# Patient Record
Sex: MALE | Race: BLACK OR AFRICAN AMERICAN | NOT HISPANIC OR LATINO | Employment: FULL TIME | URBAN - METROPOLITAN AREA
[De-identification: names, ages, dates, MRNs, and addresses within clinical notes are randomized per-mention and may not be internally consistent; named-entity substitution may affect disease eponyms.]

---

## 2023-05-30 ENCOUNTER — OFFICE VISIT (OUTPATIENT)
Dept: PRIMARY CARE | Facility: CLINIC | Age: 23
End: 2023-05-30
Payer: COMMERCIAL

## 2023-05-30 VITALS — DIASTOLIC BLOOD PRESSURE: 61 MMHG | SYSTOLIC BLOOD PRESSURE: 101 MMHG | WEIGHT: 158 LBS | HEART RATE: 76 BPM

## 2023-05-30 DIAGNOSIS — R10.30 LOWER ABDOMINAL PAIN: Primary | ICD-10-CM

## 2023-05-30 DIAGNOSIS — F90.2 ATTENTION DEFICIT HYPERACTIVITY DISORDER (ADHD), COMBINED TYPE: ICD-10-CM

## 2023-05-30 DIAGNOSIS — L70.0 ACNE VULGARIS: ICD-10-CM

## 2023-05-30 PROCEDURE — 99203 OFFICE O/P NEW LOW 30 MIN: CPT | Performed by: INTERNAL MEDICINE

## 2023-05-30 PROCEDURE — 1036F TOBACCO NON-USER: CPT | Performed by: INTERNAL MEDICINE

## 2023-05-30 RX ORDER — BENZOYL PEROXIDE 5 G/100G
GEL TOPICAL 2 TIMES DAILY
Qty: 60 G | Refills: 2 | Status: SHIPPED | OUTPATIENT
Start: 2023-05-30 | End: 2023-09-27

## 2023-05-30 ASSESSMENT — PATIENT HEALTH QUESTIONNAIRE - PHQ9
SUM OF ALL RESPONSES TO PHQ9 QUESTIONS 1 AND 2: 0
1. LITTLE INTEREST OR PLEASURE IN DOING THINGS: NOT AT ALL
2. FEELING DOWN, DEPRESSED OR HOPELESS: NOT AT ALL

## 2023-05-30 NOTE — PROGRESS NOTES
Subjective   Patient ID: Daniel Almanza is a 23 y.o. male who presents for Follow-up (Follow up E.R).    HPI     Patient is a 23-year-old male with past medical history of ADHD who presents as follow-up.  Patient was seen twice in the ER in the last year and has not been seen in this office for the last 4 years.  He states he has been having some on and off abdominal pain is affecting the left middle abdominal quadrant.  Usually he does not have the pain but when it occurs it happens quite suddenly and last for about 1 hour.  He did have a CT scan in the emergency room showing constipation and gas.  He does not eat a lot of fiber.  There is no blood in his stool.  He had lab work done showing no evidence of anemia or chronic to kidney disease    Patient has a history of ADHD and wishes to discuss the symptoms with the psychiatry    Review of Systems  Constitutional: No fever or chills  Cardiovascular: no chest pain, no palpitations and no syncope.   Respiratory: no cough, no shortness of breath during exertion and no shortness of breath at rest.   Gastrointestinal: no abdominal pain, no nausea and no vomiting.  Neuro: No Headache, no dizziness    Objective   /61   Pulse 76   Wt 71.7 kg (158 lb)     Physical Exam  Constitutional: Alert and in no acute distress. Well developed, well nourished  Head and Face: Head and face: Normal.    Cardiovascular: Heart rate and rhythm were normal, normal S1 and S2. No peripheral edema.   Pulmonary: No respiratory distress. Clear bilateral breath sounds.  Musculoskeletal: Gait and station: Normal. Muscle strength/tone: Normal.   Skin: Normal skin color and pigmentation, normal skin turgor, and no rash.    Psychiatric: Judgment and insight: Intact. Mood and affect: Normal.        Lab Results   Component Value Date    WBC 12.1 (H) 04/14/2023    HGB 15.6 04/14/2023    HCT 46.9 04/14/2023     04/14/2023    ALT 27 04/14/2023    AST 22 04/14/2023     04/14/2023     K 3.9 04/14/2023     04/14/2023    CREATININE 1.07 04/14/2023    BUN 14 04/14/2023    CO2 31 04/14/2023       CT abdomen pelvis w IV contrast  Narrative: Interpreted By:  EILEEN RAY MD  STUDY:  CT Abdomen and Pelvis with IV Contrast; 4/14/2023 2:27 PM.     INDICATION:  Lower abdominal pain with constipation and diarrhea.     COMPARISON:  None Available.     ACCESSION NUMBER(S):  [id number]     ORDERING CLINICIAN:  STEVEN SHAHID PA-C     TECHNIQUE:  CT of the abdomen and pelvis was performed.  Contiguous axial images  were obtained at 3 mm slice thickness through the abdomen and pelvis.   Coronal and sagittal reconstructions at 3 mm slice thickness were  performed.  Omnipaque 350, 75 mL was administered intravenously.  FINDINGS:     LOWER CHEST:  No cardiomegaly.  No pericardial effusion.  Lung bases are clear.     ABDOMEN:     LIVER:  No hepatomegaly.  Smooth surface contour.  Normal attenuation.     BILE DUCTS:  No intrahepatic or extrahepatic biliary ductal dilatation.     GALLBLADDER:  The gallbladder is unremarkable.     STOMACH:  No abnormalities identified.     PANCREAS:  No masses or ductal dilatation.     SPLEEN:  No splenomegaly or focal splenic lesion.     ADRENAL GLANDS:  No thickening or nodules.     KIDNEYS AND URETERS:  Kidneys are normal in size and location.  No renal or ureteral  calculi. No hydronephrosis.     PELVIS:     BLADDER:  No abnormalities identified.     REPRODUCTIVE ORGANS:  No abnormalities identified.     BOWEL:  There is no evidence of bowel obstruction. Layering fluid level seen  throughout the colon suggesting underlying diarrheal state. No  significant stool content otherwise. The appendix is unremarkable.     VESSELS:  No abnormalities identified.  Abdominal aorta is normal in caliber.      PERITONEUM/RETROPERITONEUM/LYMPH NODES:  No free fluid.  No pneumoperitoneum.     No lymphadenopathy.     ABDOMINAL WALL:  No abnormalities identified.     SOFT TISSUES:    No abnormalities identified.     BONES:  No acute fracture or aggressive osseous lesion.     Impression: Nonobstructive bowel gas pattern with normal appendix. Layering fluid  level seen throughout the colon suggesting underlying diarrheal state.  No significant stool content otherwise.        Signed by Jhoan Kay MD            Assessment/Plan   Problem List Items Addressed This Visit          Nervous    Lower abdominal pain - Primary     Unclear etiology but there is evidence based on CT imaging of constipation and gas formation.  Recommend low FODMAP diet.  Recommend increasing fiber in his diet.  Referral to nutritionist.  We will also place referral to gastroenterology should patient wish to discuss his symptoms with gastroenterology.  Follow-up 1 to 3 months for reevaluation         Relevant Orders    Referral to Gastroenterology    Referral to Nutrition Services       Other    Acne vulgaris     Start benzyl peroxide         Relevant Medications    benzoyl peroxide 5 % gel    Attention deficit hyperactivity disorder (ADHD), combined type     Referral to psychiatry         Relevant Orders    Referral to Psychiatry

## 2023-05-30 NOTE — ASSESSMENT & PLAN NOTE
Unclear etiology but there is evidence based on CT imaging of constipation and gas formation.  Recommend low FODMAP diet.  Recommend increasing fiber in his diet.  Referral to nutritionist.  We will also place referral to gastroenterology should patient wish to discuss his symptoms with gastroenterology.  Follow-up 1 to 3 months for reevaluation

## 2023-10-03 ENCOUNTER — HOSPITAL ENCOUNTER (EMERGENCY)
Facility: HOSPITAL | Age: 23
Discharge: HOME | End: 2023-10-03
Attending: GENERAL PRACTICE
Payer: COMMERCIAL

## 2023-10-03 VITALS
TEMPERATURE: 98.5 F | BODY MASS INDEX: 19.12 KG/M2 | WEIGHT: 149 LBS | SYSTOLIC BLOOD PRESSURE: 117 MMHG | OXYGEN SATURATION: 99 % | DIASTOLIC BLOOD PRESSURE: 64 MMHG | HEIGHT: 74 IN | HEART RATE: 68 BPM | RESPIRATION RATE: 18 BRPM

## 2023-10-03 DIAGNOSIS — R10.84 GENERALIZED ABDOMINAL PAIN: Primary | ICD-10-CM

## 2023-10-03 LAB
ALBUMIN SERPL BCP-MCNC: 4.8 G/DL (ref 3.4–5)
ALP SERPL-CCNC: 47 U/L (ref 33–120)
ALT SERPL W P-5'-P-CCNC: 15 U/L (ref 10–52)
ANION GAP SERPL CALC-SCNC: 10 MMOL/L (ref 10–20)
APPEARANCE UR: CLEAR
AST SERPL W P-5'-P-CCNC: 17 U/L (ref 9–39)
B-HCG SERPL-ACNC: <2 MIU/ML
BASOPHILS # BLD AUTO: 0.03 X10*3/UL (ref 0–0.1)
BASOPHILS NFR BLD AUTO: 0.4 %
BILIRUB SERPL-MCNC: 0.9 MG/DL (ref 0–1.2)
BILIRUB UR STRIP.AUTO-MCNC: NEGATIVE MG/DL
BUN SERPL-MCNC: 11 MG/DL (ref 6–23)
CALCIUM SERPL-MCNC: 9.4 MG/DL (ref 8.6–10.3)
CHLORIDE SERPL-SCNC: 105 MMOL/L (ref 98–107)
CO2 SERPL-SCNC: 30 MMOL/L (ref 21–32)
COLOR UR: YELLOW
CREAT SERPL-MCNC: 0.95 MG/DL (ref 0.5–1.3)
EOSINOPHIL # BLD AUTO: 0.01 X10*3/UL (ref 0–0.7)
EOSINOPHIL NFR BLD AUTO: 0.1 %
ERYTHROCYTE [DISTWIDTH] IN BLOOD BY AUTOMATED COUNT: 12.6 % (ref 11.5–14.5)
GFR SERPL CREATININE-BSD FRML MDRD: >90 ML/MIN/1.73M*2
GLUCOSE SERPL-MCNC: 74 MG/DL (ref 74–99)
GLUCOSE UR STRIP.AUTO-MCNC: NEGATIVE MG/DL
HCT VFR BLD AUTO: 43.4 % (ref 41–52)
HGB BLD-MCNC: 14.7 G/DL (ref 13.5–17.5)
IMM GRANULOCYTES # BLD AUTO: 0.02 X10*3/UL (ref 0–0.7)
IMM GRANULOCYTES NFR BLD AUTO: 0.3 % (ref 0–0.9)
KETONES UR STRIP.AUTO-MCNC: NEGATIVE MG/DL
LEUKOCYTE ESTERASE UR QL STRIP.AUTO: NEGATIVE
LIPASE SERPL-CCNC: 18 U/L (ref 9–82)
LYMPHOCYTES # BLD AUTO: 1.77 X10*3/UL (ref 1.2–4.8)
LYMPHOCYTES NFR BLD AUTO: 25.6 %
MAGNESIUM SERPL-MCNC: 2.3 MG/DL (ref 1.6–2.4)
MCH RBC QN AUTO: 31.6 PG (ref 26–34)
MCHC RBC AUTO-ENTMCNC: 33.9 G/DL (ref 32–36)
MCV RBC AUTO: 93 FL (ref 80–100)
MONOCYTES # BLD AUTO: 0.66 X10*3/UL (ref 0.1–1)
MONOCYTES NFR BLD AUTO: 9.6 %
NEUTROPHILS # BLD AUTO: 4.42 X10*3/UL (ref 1.2–7.7)
NEUTROPHILS NFR BLD AUTO: 64 %
NITRITE UR QL STRIP.AUTO: NEGATIVE
NRBC BLD-RTO: 0 /100 WBCS (ref 0–0)
PH UR STRIP.AUTO: 7 [PH]
PLATELET # BLD AUTO: 188 X10*3/UL (ref 150–450)
PMV BLD AUTO: 10.9 FL (ref 7.5–11.5)
POTASSIUM SERPL-SCNC: 4.3 MMOL/L (ref 3.5–5.3)
PROT SERPL-MCNC: 7.3 G/DL (ref 6.4–8.2)
PROT UR STRIP.AUTO-MCNC: NEGATIVE MG/DL
RBC # BLD AUTO: 4.65 X10*6/UL (ref 4.5–5.9)
RBC # UR STRIP.AUTO: NEGATIVE /UL
SODIUM SERPL-SCNC: 141 MMOL/L (ref 136–145)
SP GR UR STRIP.AUTO: 1.01
UROBILINOGEN UR STRIP.AUTO-MCNC: <2 MG/DL
WBC # BLD AUTO: 6.9 X10*3/UL (ref 4.4–11.3)

## 2023-10-03 PROCEDURE — 36415 COLL VENOUS BLD VENIPUNCTURE: CPT | Performed by: GENERAL PRACTICE

## 2023-10-03 PROCEDURE — 83690 ASSAY OF LIPASE: CPT | Performed by: GENERAL PRACTICE

## 2023-10-03 PROCEDURE — 81003 URINALYSIS AUTO W/O SCOPE: CPT | Performed by: GENERAL PRACTICE

## 2023-10-03 PROCEDURE — 84702 CHORIONIC GONADOTROPIN TEST: CPT | Performed by: GENERAL PRACTICE

## 2023-10-03 PROCEDURE — 85025 COMPLETE CBC W/AUTO DIFF WBC: CPT | Performed by: GENERAL PRACTICE

## 2023-10-03 PROCEDURE — 80053 COMPREHEN METABOLIC PANEL: CPT | Performed by: GENERAL PRACTICE

## 2023-10-03 PROCEDURE — 83735 ASSAY OF MAGNESIUM: CPT | Performed by: GENERAL PRACTICE

## 2023-10-03 PROCEDURE — 99283 EMERGENCY DEPT VISIT LOW MDM: CPT

## 2023-10-03 PROCEDURE — 99284 EMERGENCY DEPT VISIT MOD MDM: CPT | Performed by: GENERAL PRACTICE

## 2023-10-03 ASSESSMENT — COLUMBIA-SUICIDE SEVERITY RATING SCALE - C-SSRS
2. HAVE YOU ACTUALLY HAD ANY THOUGHTS OF KILLING YOURSELF?: NO
6. HAVE YOU EVER DONE ANYTHING, STARTED TO DO ANYTHING, OR PREPARED TO DO ANYTHING TO END YOUR LIFE?: NO
1. IN THE PAST MONTH, HAVE YOU WISHED YOU WERE DEAD OR WISHED YOU COULD GO TO SLEEP AND NOT WAKE UP?: NO

## 2023-10-03 ASSESSMENT — PAIN - FUNCTIONAL ASSESSMENT: PAIN_FUNCTIONAL_ASSESSMENT: 0-10

## 2023-10-03 ASSESSMENT — PAIN SCALES - GENERAL: PAINLEVEL_OUTOF10: 0 - NO PAIN

## 2023-10-03 NOTE — ED NOTES
Patient ready for discharge. IV dc 'd. Catheter intact. Dressing applied. Reviewed discharge instructions with patient. Patient states no questions or concerns at this time. Informed patient of follow-up information. Patient ambulatory from ED, VSS, NAD noted at this time, ABC's intact.      Myron Tello RN  10/03/23 1924

## 2023-10-03 NOTE — ED TRIAGE NOTES
Pt here bc he had a stomach ache this morning and he threw up. Pt called off wrk. His job told him that he needed a work note to return. Pt states his abd pain is gone and that he feels great now.

## 2023-10-04 NOTE — ED PROVIDER NOTES
HPI   Chief Complaint   Patient presents with    Abdominal Pain       HPI: 23-year-old male with no significant past medical history presents for abdominal pain.  He states that last evening he ate pizza that he believes he got food poisoning from.  This morning he had generalized abdominal cramping and vomited 2 times.  On presentation to the ED he is asymptomatic and states that he feels very well.  He came to the ED at the urging of his boss.      Limitations to history: None  Independent Historians: Patient  External Records Reviewed: HIE, outpatient notes, inpatient notes  ------------------------------------------------------------------------------------------------------------------------------------------  ROS: a ten point review of systems was performed and was negative except as per HPI.  ------------------------------------------------------------------------------------------------------------------------------------------  PMH / PSH: as per HPI, otherwise reviewed in EMR  MEDS: as per HPI, otherwise reviewed in EMR  ALLERGIES: as per HPI, otherwise reviewed in EMR  SocH:  as per HPI, otherwise reviewed in EMR  FH:  as per HPI, otherwise reviewed in EMR  ------------------------------------------------------------------------------------------------------------------------------------------  Physical Exam:  VS: As documented in the triage note and EMR flowsheet from this visit was reviewed  General: Well appearing. No acute distress.   Eyes:  Extraocular movements grossly intact. No scleral icterus. No discharge  HEENT:  Normocephalic.  Atraumatic  Neck: Moves neck freely. No gross masses  CV: Regular rhythm. No murmurs, rubs or gallops   Resp: Clear to auscultation bilaterally. No respiratory distress.    GI: Soft, no masses, nontender. No rebound tenderness or guarding  MSK: Symmetric muscle bulk. No deformities. No lower extremity edema.    Skin: Warm, dry, intact.   Neuro: No focal deficits.  A&O  x3.   Psych: Appropriate for situation  ------------------------------------------------------------------------------------------------------------------------------------------  Hospital Course / Medical Decision Making:  Independent Interpretations: NA  EKG as interpreted by me: EDILBERTO    MDM: This is a 23-year-old male with no significant past medical history presenting for abdominal pain after eating pizza last evening.  On presentation to the ED he is asymptomatic.  Abdomen is nontender.  He is well-appearing.  No major abnormalities on CBC or CMP.  He was informed of his findings and feels safe going home.  He will follow-up with his primary care physician as soon as possible and will return to the ED for any concerning symptoms.    Discussion of Management with Other Providers:   I discussed the patient/results with: Emergency medicine team    Final diagnosis and disposition as below.    Results for orders placed or performed during the hospital encounter of 10/03/23  -CBC and Auto Differential:        Result                      Value             Ref Range           WBC                         6.9               4.4 - 11.3 x*       nRBC                        0.0               0.0 - 0.0 /1*       RBC                         4.65              4.50 - 5.90 *       Hemoglobin                  14.7              13.5 - 17.5 *       Hematocrit                  43.4              41.0 - 52.0 %       MCV                         93                80 - 100 fL         MCH                         31.6              26.0 - 34.0 *       MCHC                        33.9              32.0 - 36.0 *       RDW                         12.6              11.5 - 14.5 %       Platelets                   188               150 - 450 x1*       MPV                         10.9              7.5 - 11.5 fL       Neutrophils %               64.0              40.0 - 80.0 %       Immature Granulocytes *     0.3               0.0 - 0.9 %          Lymphocytes %               25.6              13.0 - 44.0 %       Monocytes %                 9.6               2.0 - 10.0 %        Eosinophils %               0.1               0.0 - 6.0 %         Basophils %                 0.4               0.0 - 2.0 %         Neutrophils Absolute        4.42              1.20 - 7.70 *       Immature Granulocytes *     0.02              0.00 - 0.70 *       Lymphocytes Absolute        1.77              1.20 - 4.80 *       Monocytes Absolute          0.66              0.10 - 1.00 *       Eosinophils Absolute        0.01              0.00 - 0.70 *       Basophils Absolute          0.03              0.00 - 0.10 *  -Comprehensive metabolic panel:        Result                      Value             Ref Range           Glucose                     74                74 - 99 mg/dL       Sodium                      141               136 - 145 mm*       Potassium                   4.3               3.5 - 5.3 mm*       Chloride                    105               98 - 107 mmo*       Bicarbonate                 30                21 - 32 mmol*       Anion Gap                   10                10 - 20 mmol*       Urea Nitrogen               11                6 - 23 mg/dL        Creatinine                  0.95              0.50 - 1.30 *       eGFR                        >90               >60 mL/min/1*       Calcium                     9.4               8.6 - 10.3 m*       Albumin                     4.8               3.4 - 5.0 g/*       Alkaline Phosphatase        47                33 - 120 U/L        Total Protein               7.3               6.4 - 8.2 g/*       AST                         17                9 - 39 U/L          Bilirubin, Total            0.9               0.0 - 1.2 mg*       ALT                         15                10 - 52 U/L    -Magnesium:        Result                      Value             Ref Range           Magnesium                   2.30              1.60 -  2.40 *  -Lipase:        Result                      Value             Ref Range           Lipase                      18                9 - 82 U/L     -Urinalysis with Reflex Microscopic:        Result                      Value             Ref Range           Color, Urine                Yellow            Straw, Yellow       Appearance, Urine           Clear             Clear               Specific Gravity, Urine     1.013             1.005 - 1.035       pH, Urine                   7.0               5.0, 5.5, 6.*       Protein, Urine              NEGATIVE          NEGATIVE mg/*       Glucose, Urine              NEGATIVE          NEGATIVE mg/*       Blood, Urine                NEGATIVE          NEGATIVE            Ketones, Urine              NEGATIVE          NEGATIVE mg/*       Bilirubin, Urine            NEGATIVE          NEGATIVE            Urobilinogen, Urine         <2.0              <2.0 mg/dL          Nitrite, Urine              NEGATIVE          NEGATIVE            Leukocyte Esterase, Ur*     NEGATIVE          NEGATIVE       -Human Chorionic Gonadotropin, Serum Quantitative:        Result                      Value             Ref Range           HCG, Beta-Quantitative      <2                <5 mIU/mL      No orders to display                            No data recorded                Patient History   No past medical history on file.  No past surgical history on file.  No family history on file.  Social History     Tobacco Use    Smoking status: Never     Passive exposure: Never    Smokeless tobacco: Never   Substance Use Topics    Alcohol use: Yes    Drug use: Never       Physical Exam   ED Triage Vitals   Temp Heart Rate Resp BP   10/03/23 1456 10/03/23 1456 10/03/23 1456 10/03/23 1456   36.9 °C (98.5 °F) 70 15 113/68      SpO2 Temp Source Heart Rate Source Patient Position   10/03/23 1456 10/03/23 1456 10/03/23 1924 10/03/23 1456   98 % Oral Monitor Sitting      BP Location FiO2 (%)     10/03/23 1456 --      Right arm        Physical Exam    ED Course & MDM   Diagnoses as of 10/03/23 4537   Generalized abdominal pain       Medical Decision Making      Procedure  Procedures     Phu Villeda,   10/08/23 1520

## 2024-03-24 ENCOUNTER — HOSPITAL ENCOUNTER (EMERGENCY)
Facility: HOSPITAL | Age: 24
Discharge: HOME | End: 2024-03-24
Attending: EMERGENCY MEDICINE
Payer: COMMERCIAL

## 2024-03-24 ENCOUNTER — APPOINTMENT (OUTPATIENT)
Dept: RADIOLOGY | Facility: HOSPITAL | Age: 24
End: 2024-03-24
Payer: COMMERCIAL

## 2024-03-24 VITALS
HEART RATE: 79 BPM | SYSTOLIC BLOOD PRESSURE: 130 MMHG | DIASTOLIC BLOOD PRESSURE: 71 MMHG | OXYGEN SATURATION: 99 % | RESPIRATION RATE: 18 BRPM | TEMPERATURE: 98.7 F

## 2024-03-24 DIAGNOSIS — R10.84 GENERALIZED ABDOMINAL PAIN: Primary | ICD-10-CM

## 2024-03-24 LAB
ALBUMIN SERPL BCP-MCNC: 4.5 G/DL (ref 3.4–5)
ALP SERPL-CCNC: 53 U/L (ref 33–120)
ALT SERPL W P-5'-P-CCNC: 11 U/L (ref 10–52)
ANION GAP SERPL CALC-SCNC: 11 MMOL/L (ref 10–20)
APPEARANCE UR: CLEAR
AST SERPL W P-5'-P-CCNC: 16 U/L (ref 9–39)
BASOPHILS # BLD AUTO: 0.02 X10*3/UL (ref 0–0.1)
BASOPHILS NFR BLD AUTO: 0.2 %
BILIRUB SERPL-MCNC: 0.8 MG/DL (ref 0–1.2)
BILIRUB UR STRIP.AUTO-MCNC: NEGATIVE MG/DL
BUN SERPL-MCNC: 10 MG/DL (ref 6–23)
CALCIUM SERPL-MCNC: 9.5 MG/DL (ref 8.6–10.3)
CHLORIDE SERPL-SCNC: 107 MMOL/L (ref 98–107)
CO2 SERPL-SCNC: 26 MMOL/L (ref 21–32)
COLOR UR: NORMAL
CREAT SERPL-MCNC: 0.97 MG/DL (ref 0.5–1.3)
EGFRCR SERPLBLD CKD-EPI 2021: >90 ML/MIN/1.73M*2
EOSINOPHIL # BLD AUTO: 0.02 X10*3/UL (ref 0–0.7)
EOSINOPHIL NFR BLD AUTO: 0.2 %
ERYTHROCYTE [DISTWIDTH] IN BLOOD BY AUTOMATED COUNT: 12.3 % (ref 11.5–14.5)
GLUCOSE SERPL-MCNC: 101 MG/DL (ref 74–99)
GLUCOSE UR STRIP.AUTO-MCNC: NORMAL MG/DL
HCT VFR BLD AUTO: 44.5 % (ref 41–52)
HGB BLD-MCNC: 15.2 G/DL (ref 13.5–17.5)
IMM GRANULOCYTES # BLD AUTO: 0.03 X10*3/UL (ref 0–0.7)
IMM GRANULOCYTES NFR BLD AUTO: 0.3 % (ref 0–0.9)
KETONES UR STRIP.AUTO-MCNC: NEGATIVE MG/DL
LACTATE SERPL-SCNC: 0.9 MMOL/L (ref 0.4–2)
LEUKOCYTE ESTERASE UR QL STRIP.AUTO: NEGATIVE
LIPASE SERPL-CCNC: 21 U/L (ref 9–82)
LYMPHOCYTES # BLD AUTO: 1.16 X10*3/UL (ref 1.2–4.8)
LYMPHOCYTES NFR BLD AUTO: 10.6 %
MCH RBC QN AUTO: 32.3 PG (ref 26–34)
MCHC RBC AUTO-ENTMCNC: 34.2 G/DL (ref 32–36)
MCV RBC AUTO: 95 FL (ref 80–100)
MONOCYTES # BLD AUTO: 0.82 X10*3/UL (ref 0.1–1)
MONOCYTES NFR BLD AUTO: 7.5 %
NEUTROPHILS # BLD AUTO: 8.87 X10*3/UL (ref 1.2–7.7)
NEUTROPHILS NFR BLD AUTO: 81.2 %
NITRITE UR QL STRIP.AUTO: NEGATIVE
NRBC BLD-RTO: 0 /100 WBCS (ref 0–0)
PH UR STRIP.AUTO: 8 [PH]
PLATELET # BLD AUTO: 167 X10*3/UL (ref 150–450)
POTASSIUM SERPL-SCNC: 4.2 MMOL/L (ref 3.5–5.3)
PROT SERPL-MCNC: 6.9 G/DL (ref 6.4–8.2)
PROT UR STRIP.AUTO-MCNC: NEGATIVE MG/DL
RBC # BLD AUTO: 4.71 X10*6/UL (ref 4.5–5.9)
RBC # UR STRIP.AUTO: NEGATIVE /UL
SODIUM SERPL-SCNC: 140 MMOL/L (ref 136–145)
SP GR UR STRIP.AUTO: 1.02
UROBILINOGEN UR STRIP.AUTO-MCNC: NORMAL MG/DL
WBC # BLD AUTO: 10.9 X10*3/UL (ref 4.4–11.3)

## 2024-03-24 PROCEDURE — 99284 EMERGENCY DEPT VISIT MOD MDM: CPT | Mod: 25

## 2024-03-24 PROCEDURE — 83605 ASSAY OF LACTIC ACID: CPT | Performed by: EMERGENCY MEDICINE

## 2024-03-24 PROCEDURE — 2500000004 HC RX 250 GENERAL PHARMACY W/ HCPCS (ALT 636 FOR OP/ED): Performed by: EMERGENCY MEDICINE

## 2024-03-24 PROCEDURE — 96374 THER/PROPH/DIAG INJ IV PUSH: CPT | Mod: 59

## 2024-03-24 PROCEDURE — 74177 CT ABD & PELVIS W/CONTRAST: CPT

## 2024-03-24 PROCEDURE — 2550000001 HC RX 255 CONTRASTS: Performed by: EMERGENCY MEDICINE

## 2024-03-24 PROCEDURE — 36415 COLL VENOUS BLD VENIPUNCTURE: CPT | Performed by: EMERGENCY MEDICINE

## 2024-03-24 PROCEDURE — 83690 ASSAY OF LIPASE: CPT | Performed by: EMERGENCY MEDICINE

## 2024-03-24 PROCEDURE — 80053 COMPREHEN METABOLIC PANEL: CPT | Performed by: EMERGENCY MEDICINE

## 2024-03-24 PROCEDURE — 81003 URINALYSIS AUTO W/O SCOPE: CPT | Performed by: EMERGENCY MEDICINE

## 2024-03-24 PROCEDURE — 74177 CT ABD & PELVIS W/CONTRAST: CPT | Performed by: RADIOLOGY

## 2024-03-24 PROCEDURE — 85025 COMPLETE CBC W/AUTO DIFF WBC: CPT | Performed by: EMERGENCY MEDICINE

## 2024-03-24 RX ORDER — FAMOTIDINE 20 MG/1
20 TABLET, FILM COATED ORAL 2 TIMES DAILY
Qty: 60 TABLET | Refills: 0 | Status: SHIPPED | OUTPATIENT
Start: 2024-03-24 | End: 2025-03-24

## 2024-03-24 RX ORDER — MORPHINE SULFATE 4 MG/ML
4 INJECTION, SOLUTION INTRAMUSCULAR; INTRAVENOUS ONCE
Status: DISCONTINUED | OUTPATIENT
Start: 2024-03-24 | End: 2024-03-24

## 2024-03-24 RX ORDER — MORPHINE SULFATE 4 MG/ML
4 INJECTION, SOLUTION INTRAMUSCULAR; INTRAVENOUS ONCE
Status: COMPLETED | OUTPATIENT
Start: 2024-03-24 | End: 2024-03-24

## 2024-03-24 RX ADMIN — IOHEXOL 75 ML: 350 INJECTION, SOLUTION INTRAVENOUS at 17:32

## 2024-03-24 RX ADMIN — MORPHINE SULFATE 4 MG: 4 INJECTION, SOLUTION INTRAMUSCULAR; INTRAVENOUS at 16:23

## 2024-03-24 ASSESSMENT — PAIN DESCRIPTION - DESCRIPTORS: DESCRIPTORS: CRAMPING

## 2024-03-24 NOTE — ED PROVIDER NOTES
HPI   Chief Complaint   Patient presents with    Abdominal Pain     PT complain of abdominal pain. No problem urinating or with bowel movement. Cramping has intensified the past week.        HPI  Patient is a 23-year-old male with a past medical history significant for IBS who endorses not being compliant with his instructed diet who presents for abdominal pain over several months now worse in the last day or so.  It is mostly in the left lower quadrant accompanied by nausea without vomiting.  He has been having stools but they are less than what he expected to be.  They are nonbloody nonmelanotic.  He denies any fevers, unexpected weight loss or night sweats.  Last night the pain started again more severely so he came for evaluation.      PMHx: As above  PSHx: Denies  FamilyHx: Denies pertinent  SocialHx: Endorses marijuana use  Allergies: NKDA  Medications: See Medication Reconciliation     ROS  As above otherwise denies      Physical Exam    GENERAL: Awake and Alert, No Acute Distress  HEENT: AT/NC, PERRL, EOMI, Normal Oropharynx, No Signs of Dehydration  NECK: Normal Inspection, No JVD  CARDIOVASCULAR: RRR, No M/R/G  RESPIRATORY: CTA Bilaterally, No Wheezes, Rales or Rhonchi, Chest Wall Non-tender  ABDOMEN: Tenderness to palpation in the left lower quadrant with some guarding but no peritoneal signs.  Otherwise normal Bowel Sounds, No Distention  BACK: No CVA Tenderness  SKIN: Normal Color, Warm, Dry, No Rashes   EXTREMITIES: Non-Tender, Full ROM, No Pedal Edema  NEURO: A&O x 3, Normal Motor and Sensation, Normal Mood and Affect    Nursing Assessment and Vitals Reviewed    Medical Decision  Patient is a 23-year-old male with a past medical history significant for IBS who endorses not being compliant with his instructed diet who presents for abdominal pain over several months now worse in the last day or so.  It is mostly in the left lower quadrant accompanied by nausea without vomiting.  He has been having  stools but they are less than what he expected to be.  They are nonbloody nonmelanotic.  He denies any fevers, unexpected weight loss or night sweats.  Last night the pain started again more severely so he came for evaluation.  On evaluation patient appears uncomfortable but in no acute distress. Lungs are clear and heart is regular. Tenderness to palpation in the left lower quadrant with some guarding but no peritoneal signs.  He is given morphine and Zofran for symptom control.  Workup is performed due to concern for abdominal pelvic pathology.  Lab work thus far includes a negative CMP, lactate and CBC without acute emergent findings.  He has mild lymphopenia.  Urinalysis was negative for signs of infection.  Patient is signed out to oncoming physician pending CT of the abdomen pelvis and final disposition.                          [place] Coma Scale Score: 15                     Patient History   No past medical history on file.  No past surgical history on file.  No family history on file.  Social History     Tobacco Use    Smoking status: Never     Passive exposure: Never    Smokeless tobacco: Never   Substance Use Topics    Alcohol use: Yes    Drug use: Never       Physical Exam   ED Triage Vitals [03/24/24 1310]   Temperature Heart Rate Resp BP   37.1 °C (98.7 °F) 80 -- 102/70      Pulse Ox Temp src Heart Rate Source Patient Position   100 % -- Monitor --      BP Location FiO2 (%)     -- --       Physical Exam    ED Course & MDM        Medical Decision Making      Procedure  Procedures     Jyoti Odonnell MD  03/24/24 8971

## 2024-03-25 LAB — HOLD SPECIMEN: NORMAL

## 2024-03-25 NOTE — PROGRESS NOTES
Emergency Medicine Transition of Care Note.    I received Cailin Almanza in signout from Dr. Miller.  Please see the previous ED provider note for all HPI, PE and MDM up to the time of signout at 4 PM. This is in addition to the primary record.    In brief Cailin Almanza is an 23 y.o. male presenting for   Chief Complaint   Patient presents with    Abdominal Pain     PT complain of abdominal pain. No problem urinating or with bowel movement. Cramping has intensified the past week.      At the time of signout we were awaiting: CT result    Diagnoses as of 03/25/24 0133   Generalized abdominal pain       Medical Decision Making    On repeat examination patient has no abdominal tenderness.  CT showed possible panniculitis in the right lower quadrant but this is not the area that he is having pain.  Patient is stable for outpatient management with his primary care physician Dr. Clifton Castro.  He is requesting another prescription for Pepcid and I am encouraging him to have a high-fiber diet so that he is not constipated.  Patient is agreement with this and will take Tylenol for further pain in his abdomen.  Final diagnoses:   [R10.84] Generalized abdominal pain           Procedure  Procedures    Shreidan Roland MD

## 2024-03-25 NOTE — DISCHARGE INSTRUCTIONS
Start your pepcid  Eat a high fiber diet so you move your bowels regularly  See Dr. Clifton Castro for follow up

## 2024-09-26 PROCEDURE — 99284 EMERGENCY DEPT VISIT MOD MDM: CPT

## 2024-09-26 ASSESSMENT — PAIN SCALES - GENERAL: PAINLEVEL_OUTOF10: 3

## 2024-09-26 ASSESSMENT — PAIN - FUNCTIONAL ASSESSMENT: PAIN_FUNCTIONAL_ASSESSMENT: 0-10

## 2024-09-26 ASSESSMENT — PAIN DESCRIPTION - DESCRIPTORS: DESCRIPTORS: ACHING

## 2024-09-26 ASSESSMENT — PAIN DESCRIPTION - ORIENTATION: ORIENTATION: LEFT

## 2024-09-26 ASSESSMENT — PAIN DESCRIPTION - LOCATION: LOCATION: ABDOMEN

## 2024-09-26 ASSESSMENT — PAIN DESCRIPTION - PAIN TYPE: TYPE: ACUTE PAIN

## 2024-09-27 ENCOUNTER — HOSPITAL ENCOUNTER (EMERGENCY)
Facility: HOSPITAL | Age: 24
Discharge: HOME | End: 2024-09-28
Attending: EMERGENCY MEDICINE
Payer: COMMERCIAL

## 2024-09-27 ENCOUNTER — HOSPITAL ENCOUNTER (EMERGENCY)
Facility: HOSPITAL | Age: 24
Discharge: HOME | End: 2024-09-27
Attending: INTERNAL MEDICINE
Payer: COMMERCIAL

## 2024-09-27 VITALS
BODY MASS INDEX: 19.27 KG/M2 | OXYGEN SATURATION: 98 % | RESPIRATION RATE: 16 BRPM | HEART RATE: 99 BPM | DIASTOLIC BLOOD PRESSURE: 71 MMHG | HEIGHT: 75 IN | WEIGHT: 155 LBS | TEMPERATURE: 97.5 F | SYSTOLIC BLOOD PRESSURE: 116 MMHG

## 2024-09-27 VITALS
RESPIRATION RATE: 16 BRPM | HEART RATE: 74 BPM | TEMPERATURE: 98 F | OXYGEN SATURATION: 98 % | SYSTOLIC BLOOD PRESSURE: 105 MMHG | BODY MASS INDEX: 19.27 KG/M2 | HEIGHT: 75 IN | WEIGHT: 155 LBS | DIASTOLIC BLOOD PRESSURE: 65 MMHG

## 2024-09-27 DIAGNOSIS — R51.9 NONINTRACTABLE HEADACHE, UNSPECIFIED CHRONICITY PATTERN, UNSPECIFIED HEADACHE TYPE: Primary | ICD-10-CM

## 2024-09-27 DIAGNOSIS — R51.9 ACUTE NONINTRACTABLE HEADACHE, UNSPECIFIED HEADACHE TYPE: ICD-10-CM

## 2024-09-27 DIAGNOSIS — R10.84 GENERALIZED ABDOMINAL PAIN: Primary | ICD-10-CM

## 2024-09-27 LAB
ALBUMIN SERPL BCP-MCNC: 4.8 G/DL (ref 3.4–5)
ALP SERPL-CCNC: 55 U/L (ref 33–120)
ALT SERPL W P-5'-P-CCNC: 14 U/L (ref 10–52)
ANION GAP SERPL CALC-SCNC: 15 MMOL/L (ref 10–20)
AST SERPL W P-5'-P-CCNC: 17 U/L (ref 9–39)
BASOPHILS # BLD AUTO: 0.03 X10*3/UL (ref 0–0.1)
BASOPHILS NFR BLD AUTO: 0.4 %
BILIRUB SERPL-MCNC: 0.8 MG/DL (ref 0–1.2)
BUN SERPL-MCNC: 15 MG/DL (ref 6–23)
CALCIUM SERPL-MCNC: 9.5 MG/DL (ref 8.6–10.3)
CHLORIDE SERPL-SCNC: 102 MMOL/L (ref 98–107)
CO2 SERPL-SCNC: 23 MMOL/L (ref 21–32)
CREAT SERPL-MCNC: 0.99 MG/DL (ref 0.5–1.3)
EGFRCR SERPLBLD CKD-EPI 2021: >90 ML/MIN/1.73M*2
EOSINOPHIL # BLD AUTO: 0.02 X10*3/UL (ref 0–0.7)
EOSINOPHIL NFR BLD AUTO: 0.3 %
ERYTHROCYTE [DISTWIDTH] IN BLOOD BY AUTOMATED COUNT: 12.5 % (ref 11.5–14.5)
GLUCOSE SERPL-MCNC: 77 MG/DL (ref 74–99)
HCT VFR BLD AUTO: 44.3 % (ref 41–52)
HGB BLD-MCNC: 15.5 G/DL (ref 13.5–17.5)
HOLD SPECIMEN: NORMAL
IMM GRANULOCYTES # BLD AUTO: 0.02 X10*3/UL (ref 0–0.7)
IMM GRANULOCYTES NFR BLD AUTO: 0.3 % (ref 0–0.9)
LIPASE SERPL-CCNC: 24 U/L (ref 9–82)
LYMPHOCYTES # BLD AUTO: 2.33 X10*3/UL (ref 1.2–4.8)
LYMPHOCYTES NFR BLD AUTO: 32 %
MCH RBC QN AUTO: 32 PG (ref 26–34)
MCHC RBC AUTO-ENTMCNC: 35 G/DL (ref 32–36)
MCV RBC AUTO: 91 FL (ref 80–100)
MONOCYTES # BLD AUTO: 0.76 X10*3/UL (ref 0.1–1)
MONOCYTES NFR BLD AUTO: 10.4 %
NEUTROPHILS # BLD AUTO: 4.13 X10*3/UL (ref 1.2–7.7)
NEUTROPHILS NFR BLD AUTO: 56.6 %
NRBC BLD-RTO: 0 /100 WBCS (ref 0–0)
PLATELET # BLD AUTO: 172 X10*3/UL (ref 150–450)
POTASSIUM SERPL-SCNC: 3.7 MMOL/L (ref 3.5–5.3)
PROT SERPL-MCNC: 7.4 G/DL (ref 6.4–8.2)
RBC # BLD AUTO: 4.85 X10*6/UL (ref 4.5–5.9)
SARS-COV-2 RNA RESP QL NAA+PROBE: NOT DETECTED
SODIUM SERPL-SCNC: 136 MMOL/L (ref 136–145)
WBC # BLD AUTO: 7.3 X10*3/UL (ref 4.4–11.3)

## 2024-09-27 PROCEDURE — 85025 COMPLETE CBC W/AUTO DIFF WBC: CPT | Performed by: INTERNAL MEDICINE

## 2024-09-27 PROCEDURE — 83690 ASSAY OF LIPASE: CPT | Performed by: INTERNAL MEDICINE

## 2024-09-27 PROCEDURE — 99284 EMERGENCY DEPT VISIT MOD MDM: CPT | Performed by: EMERGENCY MEDICINE

## 2024-09-27 PROCEDURE — 36415 COLL VENOUS BLD VENIPUNCTURE: CPT | Performed by: INTERNAL MEDICINE

## 2024-09-27 PROCEDURE — 2500000004 HC RX 250 GENERAL PHARMACY W/ HCPCS (ALT 636 FOR OP/ED): Performed by: INTERNAL MEDICINE

## 2024-09-27 PROCEDURE — 80053 COMPREHEN METABOLIC PANEL: CPT | Performed by: INTERNAL MEDICINE

## 2024-09-27 PROCEDURE — 96374 THER/PROPH/DIAG INJ IV PUSH: CPT

## 2024-09-27 PROCEDURE — 87635 SARS-COV-2 COVID-19 AMP PRB: CPT | Performed by: INTERNAL MEDICINE

## 2024-09-27 RX ORDER — OMEPRAZOLE 20 MG/1
20 CAPSULE, DELAYED RELEASE ORAL DAILY
Qty: 30 CAPSULE | Refills: 0 | Status: SHIPPED | OUTPATIENT
Start: 2024-09-27 | End: 2024-10-27

## 2024-09-27 RX ORDER — KETOROLAC TROMETHAMINE 30 MG/ML
30 INJECTION, SOLUTION INTRAMUSCULAR; INTRAVENOUS ONCE
Status: COMPLETED | OUTPATIENT
Start: 2024-09-27 | End: 2024-09-27

## 2024-09-27 ASSESSMENT — PAIN DESCRIPTION - LOCATION
LOCATION: HEAD
LOCATION: HEAD

## 2024-09-27 ASSESSMENT — PAIN DESCRIPTION - ORIENTATION: ORIENTATION: LEFT

## 2024-09-27 ASSESSMENT — PAIN SCALES - GENERAL: PAINLEVEL_OUTOF10: 3

## 2024-09-27 ASSESSMENT — PAIN DESCRIPTION - PAIN TYPE: TYPE: ACUTE PAIN

## 2024-09-27 ASSESSMENT — COLUMBIA-SUICIDE SEVERITY RATING SCALE - C-SSRS
6. HAVE YOU EVER DONE ANYTHING, STARTED TO DO ANYTHING, OR PREPARED TO DO ANYTHING TO END YOUR LIFE?: NO
2. HAVE YOU ACTUALLY HAD ANY THOUGHTS OF KILLING YOURSELF?: NO
1. IN THE PAST MONTH, HAVE YOU WISHED YOU WERE DEAD OR WISHED YOU COULD GO TO SLEEP AND NOT WAKE UP?: NO
6. HAVE YOU EVER DONE ANYTHING, STARTED TO DO ANYTHING, OR PREPARED TO DO ANYTHING TO END YOUR LIFE?: NO
1. IN THE PAST MONTH, HAVE YOU WISHED YOU WERE DEAD OR WISHED YOU COULD GO TO SLEEP AND NOT WAKE UP?: NO
2. HAVE YOU ACTUALLY HAD ANY THOUGHTS OF KILLING YOURSELF?: NO

## 2024-09-27 ASSESSMENT — PAIN DESCRIPTION - DESCRIPTORS
DESCRIPTORS: CRAMPING
DESCRIPTORS: ACHING

## 2024-09-27 NOTE — ED TRIAGE NOTES
Pt came in to ED c/o abdominal pain and headache. Pt stated that abdominal pain gets worse in the morning. Pt denies n/v, sob and diarrhea.

## 2024-09-27 NOTE — DISCHARGE INSTRUCTIONS
You were seen today for headache and abdominal pain.  Your evaluation was not concerning for an emergency at this time.  We started you on an antacid for your chronic abdominal pain and throat clearing.  Consider following up with a GI if symptoms persist.  We talked about your current stressors and that you may benefit from talking to your PCP about your anxiety over your current health situation.  Please see the attached information sheet for information about your condition, how to care for your condition at home, and reasons to return to the emergency department. Take any prescriptions written today as prescribed. You should call your primary care provider within 24 hours to tell them about today's visit, including any new medications or medication changes, as he or she may want to see you in the office for further evaluation. If you do not have a primary care provider, call  [phone] for an appointment. We offer in-person office visits as well as virtual options. Please do not hesitate to call  [phone] or return to the emergency department with any new or unresolved concerns or symptoms. Thank you for choosing [facility] for your care.

## 2024-09-27 NOTE — ED PROVIDER NOTES
"HPI     CC: Abdominal Pain and Headache     HPI: Cailin Almanza is a 24 y.o. male with no significant past medical history presents with abdominal pain and headache.  Patient states that he has had stomach pains for years, he has been seen in the ED for this several times in the past, and was told that he may have IBS.  His pain is predominantly left-sided, crampy in nature, worse in the morning.  He is having normal bowel movements.  It is not worsened of late.  He states that he comes in today mainly because he had access to a car and found that his insurance was reactivated so he thought he would get it checked out.  He has also been having mild headaches for the past 3 days.  They are localized to the right forehead, worse at night, and improve when he wakes up in the morning.  They are at worst 2-3/10 severity.  He has no associated vision changes, nausea, vomiting, neck pain or stiffness, fevers or chills.  He does have a mild cough.  He states he is mostly concerned about his headache because he knows his abdominal pain is chronic.  He states having a headache is \"not normal.\"    ROS: 10-point review of systems was performed and is otherwise negative except as noted in HPI.    Limitations to history: N/A    Independent Historians: N/A    External Records Reviewed: Multiple prior ED notes    Past Medical History: Noncontributory except per HPI     Past Surgical History: Noncontributory except per HPI     Family History: Reviewed and noncontributory     Social History:  Denies tobacco.  Moderately consistent alcohol use.  Daily marijuana use.    Social Determinants Affecting Care: N/A    No Known Allergies    Home Meds:   Current Outpatient Medications   Medication Instructions    famotidine (PEPCID) 20 mg, oral, 2 times daily    omeprazole (PRILOSEC) 20 mg, oral, Daily, Do not crush or chew.        Physical Exam     ED Triage Vitals [09/26/24 2355]   Temperature Heart Rate Respirations BP   36.7 °C (98 " "°F) 94 17 102/67      Pulse Ox Temp Source Heart Rate Source Patient Position   99 % Temporal Monitor --      BP Location FiO2 (%)     -- --         Heart Rate:  [73-94]   Temperature:  [36.7 °C (98 °F)]   Respirations:  [16-17]   BP: (102-113)/(65-67)   Height:  [190.5 cm (6' 3\")]   Weight:  [70.3 kg (155 lb)]   Pulse Ox:  [96 %-99 %]      Physical Exam  Vitals and nursing note reviewed.     CONSTITUTIONAL: Well appearing, well nourished, in no acute distress.   HENMT: Head atraumatic.  No temporal artery tenderness, normal pulsation.  No frontal sinus tenderness.  Airway patent. Nasal mucosa clear. Mouth with normal mucosa, clear oropharynx. Uvula midline. Neck supple.    EYES: Clear bilaterally, pupils equally round and reactive to light.   CARDIOVASCULAR: Normal rate, regular rhythm.  Heart sounds S1, S2.  No murmurs, rubs or gallops. Normal pulses. Capillary refill < 2 sec.   RESPIRATORY: No increased work of breathing. Breath sounds clear and equal bilaterally.  GASTROINTESTINAL: Abdomen soft, non-distended, non-tender. No rebound, no guarding. Normal bowel sounds. No palpable masses.  GENITOURINARY:  No CVA tenderness.  MUSCULOSKELETAL: Spine appears normal, range of motion is not limited, no muscle or joint tenderness. No edema.   NEUROLOGICAL: AAO x3. CN II-XII intact. 5/5 strength and SILT UEs/LEs. FTN intact. No pronator drift. Negative romberg. Normal gait.  SKIN: Warm, dry and intact. No rash or notable lesions.  PSYCHIATRIC: Normal mood and affect.  HEME/LYMPH: No adenopathy or splenomegaly.    Diagnostic Results      ECG: None performed    Labs Reviewed   COMPREHENSIVE METABOLIC PANEL - Normal       Result Value    Glucose 77      Sodium 136      Potassium 3.7      Chloride 102      Bicarbonate 23      Anion Gap 15      Urea Nitrogen 15      Creatinine 0.99      eGFR >90      Calcium 9.5      Albumin 4.8      Alkaline Phosphatase 55      Total Protein 7.4      AST 17      Bilirubin, Total 0.8      ALT " 14     LIPASE - Normal    Lipase 24      Narrative:     Venipuncture immediately after or during the administration of Metamizole may lead to falsely low results. Testing should be performed immediately prior to Metamizole dosing.   SARS-COV-2 PCR - Normal    Coronavirus 2019, PCR Not Detected      Narrative:     This assay has received FDA Emergency Use Authorization (EUA) and is only authorized for the duration of time that circumstances exist to justify the authorization of the emergency use of in vitro diagnostic tests for the detection of SARS-CoV-2 virus and/or diagnosis of COVID-19 infection under section 564(b)(1) of the Act, 21 U.S.C. 360bbb-3(b)(1). This assay is an in vitro diagnostic nucleic acid amplification test for the qualitative detection of SARS-CoV-2 from nasopharyngeal specimens and has been validated for use at [facility]. Negative results do not preclude COVID-19 infections and should not be used as the sole basis for diagnosis, treatment, or other management decisions.     CBC WITH AUTO DIFFERENTIAL    WBC 7.3      nRBC 0.0      RBC 4.85      Hemoglobin 15.5      Hematocrit 44.3      MCV 91      MCH 32.0      MCHC 35.0      RDW 12.5      Platelets 172      Neutrophils % 56.6      Immature Granulocytes %, Automated 0.3      Lymphocytes % 32.0      Monocytes % 10.4      Eosinophils % 0.3      Basophils % 0.4      Neutrophils Absolute 4.13      Immature Granulocytes Absolute, Automated 0.02      Lymphocytes Absolute 2.33      Monocytes Absolute 0.76      Eosinophils Absolute 0.02      Basophils Absolute 0.03     GRAY TOP         No orders to display                 [place] Coma Scale Score: 15                  Procedure  Procedures    ED Course & MDM   Assessment/Plan:   Cailin Almanza is a 24 y.o. male with no significant past medical history presents with abdominal pain and headache.  Abdominal pain is chronic, crampy, worse in the morning and not worsened of  late.  His abdominal exam is benign.  Differential includes GERD/PUD, viral gastritis, IBS.  I have very low suspicion for an acute emergent intra-abdominal process to warrant imaging.  It may be exacerbated by his chronic alcohol and marijuana use.  His headache is mild, intermittent, with no red flags for SAH or other occult intracranial pathology to warrant imaging.  Will treat for possible migraine with Toradol.  Will screen for major inflammatory process or metabolic derangement with basic labs.  He may have a viral syndrome, COVID swab was sent. See below for details of ED course and ultimate disposition.    Medications   ketorolac (Toradol) injection 30 mg (30 mg intravenous Given 9/27/24 0103)        ED Course as of 09/27/24 0159   Fri Sep 27, 2024   0158 Labs are normal including CBC, CMP, lipase, and COVID.  Patient feels better after Toradol.  He is endorsing some chronic throat clearing, that coupled with the left-sided abdominal pain makes me wonder about GERD.  He will be prescribed a trial of omeprazole.  He was advised to follow-up with GI if symptoms persist.  He should also follow-up with his PCP as he  endorsed significant anxiety over his health concerns.  Patient was discharged home with anticipatory guidance and strict return precautions. [CG]      ED Course User Index  [CG] Yancy Ortiz MD         Diagnoses as of 09/27/24 0159   Generalized abdominal pain   Acute nonintractable headache, unspecified headache type       Disposition:   DISCHARGE.  The patient was discharged with both verbal and written anticipatory guidance and strict return precautions. Discharge diagnosis, instructions and plan were discussed and understood. I emphasized the importance of following up with their primary care provider within 24-48 hours and with any referrals in the timeframe recommended. At the time of discharge the patient was comfortable and was in no apparent distress. Patient is aware of diagnostic  uncertainty and was notified though testing is negative here, there is a very small chance that pathology may be missed.  The patient understands these risks and the patient/family understood to call 911 or return immediately to the emergency department if the symptoms worsen or if they have any additional concerns.      FOLLOW UP  Primary care provider in 1-2 days.      ED Prescriptions       Medication Sig Dispense Start Date End Date Auth. Provider    omeprazole (PriLOSEC) 20 mg DR capsule Take 1 capsule (20 mg) by mouth once daily. Do not crush or chew. 30 capsule 9/27/2024 10/27/2024 MD Yancy Mcginnis MD  EM/IM/Peds    This note was dictated by speech recognition. Minor errors in transcription may be present.     Yancy Ortiz MD  09/27/24 9872

## 2024-09-28 ENCOUNTER — APPOINTMENT (OUTPATIENT)
Dept: RADIOLOGY | Facility: HOSPITAL | Age: 24
End: 2024-09-28
Payer: COMMERCIAL

## 2024-09-28 PROCEDURE — 70450 CT HEAD/BRAIN W/O DYE: CPT | Performed by: SURGERY

## 2024-09-28 PROCEDURE — 2500000004 HC RX 250 GENERAL PHARMACY W/ HCPCS (ALT 636 FOR OP/ED): Performed by: SURGERY

## 2024-09-28 PROCEDURE — 70450 CT HEAD/BRAIN W/O DYE: CPT

## 2024-09-28 PROCEDURE — 96372 THER/PROPH/DIAG INJ SC/IM: CPT | Performed by: SURGERY

## 2024-09-28 RX ORDER — KETOROLAC TROMETHAMINE 30 MG/ML
15 INJECTION, SOLUTION INTRAMUSCULAR; INTRAVENOUS ONCE
Status: COMPLETED | OUTPATIENT
Start: 2024-09-28 | End: 2024-09-28

## 2024-09-28 RX ADMIN — KETOROLAC TROMETHAMINE 15 MG: 30 INJECTION, SOLUTION INTRAMUSCULAR at 02:21

## 2024-09-28 NOTE — ED TRIAGE NOTES
Pt came in to ED c/o abdominal pain and headache. Pt stated that abdominal pain gets worse in the morning. Pt denies n/v, sob and diarrhea.  Pt wants a scan of his head. Pt stated that his stomach feels like bubbling.

## 2024-09-28 NOTE — ED PROVIDER NOTES
Chief Complaint   Patient presents with    Headache    Abdominal Pain     HPI:   Cailin Almanza is an 24 y.o. male with no significant past medical history presenting to the ED for chief complaint of headache.  Patient was evaluated in the ED earlier this morning for abdominal pain and a headache.  He has had these aches and pains that have been ongoing for years.   He still reports a bubbling sensation in his stomach that is worse in the morning. He has been seen in the ED several times in the past and was diagnosed with IBS.  He has not had CT imaging of his head.  He reports a headache that began 3 days ago that is intermittent.  Not a thunderclap headache or the worst headache of his life.  Patient has severe anxiety that he thinks something may be wrong inside of his head.  He is requesting a CT scan.     No Known Allergies:  No past medical history on file.  No past surgical history on file.  No family history on file.     Physical Exam  Vitals and nursing note reviewed.   Constitutional:       General: He is not in acute distress.     Appearance: He is well-developed.   HENT:      Head: Normocephalic and atraumatic.      Mouth/Throat:      Mouth: Mucous membranes are moist.      Pharynx: Oropharynx is clear.   Eyes:      Extraocular Movements: Extraocular movements intact.      Right eye: No nystagmus.      Conjunctiva/sclera: Conjunctivae normal.      Pupils: Pupils are equal, round, and reactive to light.   Cardiovascular:      Rate and Rhythm: Normal rate and regular rhythm.      Heart sounds: No murmur heard.  Pulmonary:      Effort: Pulmonary effort is normal. No respiratory distress.      Breath sounds: Normal breath sounds.   Abdominal:      Palpations: Abdomen is soft.      Tenderness: There is no abdominal tenderness.   Musculoskeletal:         General: No swelling.      Cervical back: Neck supple. No rigidity.   Skin:     General: Skin is warm and dry.      Capillary Refill: Capillary refill  "takes less than 2 seconds.   Neurological:      Mental Status: He is alert and oriented to person, place, and time.      GCS: GCS eye subscore is 4. GCS verbal subscore is 5. GCS motor subscore is 6.   Psychiatric:         Mood and Affect: Mood normal.        VS: As documented in the triage note and EMR flowsheet from this visit were reviewed.    External Records Reviewed: I reviewed recent and relevant outside records including: Reviewed ED note from this morning.  Patient had lab work CBC, CMP, lipase and COVID testing which were all normal.  He did feel relief of his symptoms after he received Toradol.  He was placed on omeprazole and recommended to follow-up with GI for persistent symptoms.  He did endorse significant anxiety over health concerns at that time.      Medical Decision Making: This is a 24-year-old male presenting to the ED for the second time today for chief complaint of a headache.  It is not the worst headache of his life.  Not a thunderclap headache.  He explains that it is not significantly painful however he just has a \"weird\" sensation over his crown on his head.  He is not diabetic I have low concerns for neuropathy.  His neurological exam was completely intact. No signs of meningismus.  He was not exhibiting any symptoms of migraine, no photophobia nausea or vomiting.  Low suspicion for intracranial abnormality however patient has significant anxiety and was requesting CT imaging.  His imaging was negative for any acute pathology.  His exam was entirely benign.  He was very reassured by these results.  Recommended follow-up with his PCP for persistent symptoms.  His pain was medicated with Toradol in the ED.  He is appropriate for outpatient management.      Counseling: Spoke with the patient and discussed today´s findings, in addition to providing specific details for the plan of care and expected course.  Patient was given the opportunity to ask questions.    Discussed return precautions " and importance of follow-up.  Advised to follow-up with PCP.  I specifically advised to return to the ED for changing or worsening symptoms, new symptoms, complaint specific precautions, and precautions listed on the discharge paperwork.  Educated on the common potential side effects of medications prescribed.    I advised the patient that the emergency evaluation and treatment provided today doesn't end their need for medical care. It is very important that they follow-up with their primary care provider or other specialist as instructed.    The plan of care was mutually agreed upon with the patient. The patient and/or family were given the opportunity to ask questions. All questions asked today in the ED were answered to the best of my ability with today's information.    This patient was cared for in the setting of nationwide stress on resources and staffing.    This report was transcribed using voice recognition software.  Every effort was made to ensure accuracy, however, inadvertently computerized transcription errors may be present.       Naresh Ortiz PA-C  09/28/24 0210

## 2024-09-28 NOTE — ED TRIAGE NOTES
This patient was seen in triage.     Vitals are noted.      HPI:  Patient is a 24-year-old male who was just in the emergency department for his abdominal pain, endorses constipation, states abdominal pain has been ongoing for years.  States that he is mainly here due to his headache that started 3 days ago, intermittent.  No photophobia, no neck pain, no fevers, chills.  States that he does have anxiety and does not have anyone to talk to and feels that he is anxious that there might be something wrong inside of his head, patient is requesting a CAT scan.    Focused PE:  Gen: Well-appearing, not in acute distress  Cardiovascular: Regular rate, normal rhythm, no murmur, no gallop  Respiratory: No adventitious lung sounds auscultated.  Abdomen: No reproducible abdominal tenderness upon palpation,  physical exam may be limited by patient positioning sitting up in a chair  Neuro:  Alert and Oriented, speech clear and coherent     Plan:  CT scan        For the remainder of the patient's workup and ED course, please see the main ED provider note.  We discussed need for diagnostic testing including lab studies and imaging.  We also discussed that they may be asked to wait in the waiting room while these test are pending.  They understand that if they choose to leave without having the testing completed or resulted that we cannot rule out acute life-threatening illnesses and the risks involved to lead to worsening condition, permanent disability or even death.

## 2024-09-28 NOTE — DISCHARGE INSTRUCTIONS
You have been seen at a [facility].  Please follow-up with your primary care provider in the next 1 to 2 days for further evaluation and routine follow-up.  Your CT imaging did not show any acute issues today.  Please return to the emergency room if having any worsening symptoms.  Please follow-up with any specialists if discussed during your emergency room stay.

## 2024-09-30 ENCOUNTER — HOSPITAL ENCOUNTER (EMERGENCY)
Facility: HOSPITAL | Age: 24
Discharge: HOME | End: 2024-09-30
Attending: STUDENT IN AN ORGANIZED HEALTH CARE EDUCATION/TRAINING PROGRAM
Payer: COMMERCIAL

## 2024-09-30 ENCOUNTER — APPOINTMENT (OUTPATIENT)
Dept: RADIOLOGY | Facility: HOSPITAL | Age: 24
End: 2024-09-30
Payer: COMMERCIAL

## 2024-09-30 VITALS
RESPIRATION RATE: 18 BRPM | OXYGEN SATURATION: 100 % | HEIGHT: 74 IN | DIASTOLIC BLOOD PRESSURE: 88 MMHG | SYSTOLIC BLOOD PRESSURE: 136 MMHG | HEART RATE: 72 BPM | BODY MASS INDEX: 19.25 KG/M2 | TEMPERATURE: 97.5 F | WEIGHT: 150 LBS

## 2024-09-30 DIAGNOSIS — N12 PYELONEPHRITIS: Primary | ICD-10-CM

## 2024-09-30 DIAGNOSIS — G43.809 OTHER MIGRAINE WITHOUT STATUS MIGRAINOSUS, NOT INTRACTABLE: ICD-10-CM

## 2024-09-30 DIAGNOSIS — N17.9 AKI (ACUTE KIDNEY INJURY) (CMS-HCC): ICD-10-CM

## 2024-09-30 LAB
ALBUMIN SERPL BCP-MCNC: 4.9 G/DL (ref 3.4–5)
ALP SERPL-CCNC: 58 U/L (ref 33–120)
ALT SERPL W P-5'-P-CCNC: 13 U/L (ref 10–52)
ANION GAP SERPL CALC-SCNC: 11 MMOL/L (ref 10–20)
APPEARANCE UR: CLEAR
AST SERPL W P-5'-P-CCNC: 17 U/L (ref 9–39)
BASOPHILS # BLD AUTO: 0.02 X10*3/UL (ref 0–0.1)
BASOPHILS NFR BLD AUTO: 0.2 %
BILIRUB SERPL-MCNC: 1.2 MG/DL (ref 0–1.2)
BILIRUB UR STRIP.AUTO-MCNC: NEGATIVE MG/DL
BUN SERPL-MCNC: 12 MG/DL (ref 6–23)
CALCIUM SERPL-MCNC: 10 MG/DL (ref 8.6–10.3)
CHLORIDE SERPL-SCNC: 102 MMOL/L (ref 98–107)
CO2 SERPL-SCNC: 31 MMOL/L (ref 21–32)
COLOR UR: COLORLESS
CREAT SERPL-MCNC: 1.44 MG/DL (ref 0.5–1.3)
EGFRCR SERPLBLD CKD-EPI 2021: 70 ML/MIN/1.73M*2
EOSINOPHIL # BLD AUTO: 0.01 X10*3/UL (ref 0–0.7)
EOSINOPHIL NFR BLD AUTO: 0.1 %
ERYTHROCYTE [DISTWIDTH] IN BLOOD BY AUTOMATED COUNT: 12.6 % (ref 11.5–14.5)
GLUCOSE SERPL-MCNC: 89 MG/DL (ref 74–99)
GLUCOSE UR STRIP.AUTO-MCNC: NORMAL MG/DL
HCT VFR BLD AUTO: 46.2 % (ref 41–52)
HGB BLD-MCNC: 15.9 G/DL (ref 13.5–17.5)
IMM GRANULOCYTES # BLD AUTO: 0.05 X10*3/UL (ref 0–0.7)
IMM GRANULOCYTES NFR BLD AUTO: 0.4 % (ref 0–0.9)
KETONES UR STRIP.AUTO-MCNC: NEGATIVE MG/DL
LEUKOCYTE ESTERASE UR QL STRIP.AUTO: NEGATIVE
LYMPHOCYTES # BLD AUTO: 0.88 X10*3/UL (ref 1.2–4.8)
LYMPHOCYTES NFR BLD AUTO: 7.4 %
MCH RBC QN AUTO: 32 PG (ref 26–34)
MCHC RBC AUTO-ENTMCNC: 34.4 G/DL (ref 32–36)
MCV RBC AUTO: 93 FL (ref 80–100)
MONOCYTES # BLD AUTO: 1.28 X10*3/UL (ref 0.1–1)
MONOCYTES NFR BLD AUTO: 10.8 %
NEUTROPHILS # BLD AUTO: 9.66 X10*3/UL (ref 1.2–7.7)
NEUTROPHILS NFR BLD AUTO: 81.1 %
NITRITE UR QL STRIP.AUTO: NEGATIVE
NRBC BLD-RTO: 0 /100 WBCS (ref 0–0)
PH UR STRIP.AUTO: 7 [PH]
PLATELET # BLD AUTO: 163 X10*3/UL (ref 150–450)
POTASSIUM SERPL-SCNC: 4.4 MMOL/L (ref 3.5–5.3)
PROT SERPL-MCNC: 7.5 G/DL (ref 6.4–8.2)
PROT UR STRIP.AUTO-MCNC: ABNORMAL MG/DL
RBC # BLD AUTO: 4.97 X10*6/UL (ref 4.5–5.9)
RBC # UR STRIP.AUTO: NEGATIVE /UL
RBC #/AREA URNS AUTO: NORMAL /HPF
SODIUM SERPL-SCNC: 140 MMOL/L (ref 136–145)
SP GR UR STRIP.AUTO: >1.05
SQUAMOUS #/AREA URNS AUTO: NORMAL /HPF
UROBILINOGEN UR STRIP.AUTO-MCNC: NORMAL MG/DL
WBC # BLD AUTO: 11.9 X10*3/UL (ref 4.4–11.3)
WBC #/AREA URNS AUTO: NORMAL /HPF

## 2024-09-30 PROCEDURE — 74177 CT ABD & PELVIS W/CONTRAST: CPT

## 2024-09-30 PROCEDURE — 85025 COMPLETE CBC W/AUTO DIFF WBC: CPT | Performed by: STUDENT IN AN ORGANIZED HEALTH CARE EDUCATION/TRAINING PROGRAM

## 2024-09-30 PROCEDURE — 99284 EMERGENCY DEPT VISIT MOD MDM: CPT | Mod: 25

## 2024-09-30 PROCEDURE — 80053 COMPREHEN METABOLIC PANEL: CPT | Performed by: STUDENT IN AN ORGANIZED HEALTH CARE EDUCATION/TRAINING PROGRAM

## 2024-09-30 PROCEDURE — 96375 TX/PRO/DX INJ NEW DRUG ADDON: CPT

## 2024-09-30 PROCEDURE — 2550000001 HC RX 255 CONTRASTS: Performed by: STUDENT IN AN ORGANIZED HEALTH CARE EDUCATION/TRAINING PROGRAM

## 2024-09-30 PROCEDURE — 2500000004 HC RX 250 GENERAL PHARMACY W/ HCPCS (ALT 636 FOR OP/ED): Performed by: STUDENT IN AN ORGANIZED HEALTH CARE EDUCATION/TRAINING PROGRAM

## 2024-09-30 PROCEDURE — 96361 HYDRATE IV INFUSION ADD-ON: CPT

## 2024-09-30 PROCEDURE — 81001 URINALYSIS AUTO W/SCOPE: CPT | Performed by: STUDENT IN AN ORGANIZED HEALTH CARE EDUCATION/TRAINING PROGRAM

## 2024-09-30 PROCEDURE — 36415 COLL VENOUS BLD VENIPUNCTURE: CPT | Performed by: STUDENT IN AN ORGANIZED HEALTH CARE EDUCATION/TRAINING PROGRAM

## 2024-09-30 PROCEDURE — 96365 THER/PROPH/DIAG IV INF INIT: CPT

## 2024-09-30 PROCEDURE — 74177 CT ABD & PELVIS W/CONTRAST: CPT | Performed by: RADIOLOGY

## 2024-09-30 RX ORDER — KETOROLAC TROMETHAMINE 30 MG/ML
30 INJECTION, SOLUTION INTRAMUSCULAR; INTRAVENOUS ONCE
Status: COMPLETED | OUTPATIENT
Start: 2024-09-30 | End: 2024-09-30

## 2024-09-30 RX ORDER — CEPHALEXIN 500 MG/1
500 CAPSULE ORAL 4 TIMES DAILY
Qty: 40 CAPSULE | Refills: 0 | Status: SHIPPED | OUTPATIENT
Start: 2024-09-30 | End: 2024-10-10

## 2024-09-30 RX ORDER — DIPHENHYDRAMINE HYDROCHLORIDE 50 MG/ML
25 INJECTION INTRAMUSCULAR; INTRAVENOUS ONCE
Status: COMPLETED | OUTPATIENT
Start: 2024-09-30 | End: 2024-09-30

## 2024-09-30 RX ORDER — MAGNESIUM SULFATE 1 G/100ML
1 INJECTION INTRAVENOUS ONCE
Status: DISCONTINUED | OUTPATIENT
Start: 2024-09-30 | End: 2024-09-30 | Stop reason: HOSPADM

## 2024-09-30 RX ORDER — METOCLOPRAMIDE HYDROCHLORIDE 5 MG/ML
10 INJECTION INTRAMUSCULAR; INTRAVENOUS ONCE
Status: COMPLETED | OUTPATIENT
Start: 2024-09-30 | End: 2024-09-30

## 2024-09-30 ASSESSMENT — PAIN SCALES - GENERAL
PAINLEVEL_OUTOF10: 0 - NO PAIN
PAINLEVEL_OUTOF10: 7

## 2024-09-30 ASSESSMENT — PAIN DESCRIPTION - DESCRIPTORS: DESCRIPTORS: ACHING

## 2024-09-30 ASSESSMENT — COLUMBIA-SUICIDE SEVERITY RATING SCALE - C-SSRS
6. HAVE YOU EVER DONE ANYTHING, STARTED TO DO ANYTHING, OR PREPARED TO DO ANYTHING TO END YOUR LIFE?: NO
1. IN THE PAST MONTH, HAVE YOU WISHED YOU WERE DEAD OR WISHED YOU COULD GO TO SLEEP AND NOT WAKE UP?: NO
2. HAVE YOU ACTUALLY HAD ANY THOUGHTS OF KILLING YOURSELF?: NO

## 2024-09-30 ASSESSMENT — PAIN - FUNCTIONAL ASSESSMENT
PAIN_FUNCTIONAL_ASSESSMENT: 0-10
PAIN_FUNCTIONAL_ASSESSMENT: 0-10

## 2024-09-30 NOTE — ED TRIAGE NOTES
PT is A/Ox4 coming in for ABD pain/vomiting. PT stated that for the past few days his stomach has been hurting and has had lower back pain, PT started to vomit today. No there complaints or history.

## 2024-09-30 NOTE — DISCHARGE INSTRUCTIONS
You have been seen at a [facility].  Please follow-up with your primary care provider in the next 1 to 2 days for further evaluation and routine follow-up.  Please return to the emergency room if having any worsening symptoms.  Please follow-up with any specialists if discussed during your emergency room stay.

## 2024-09-30 NOTE — ED PROVIDER NOTES
EMERGENCY MEDICINE EVALUATION NOTE    History of Present Illness     Chief Complaint:   Chief Complaint   Patient presents with    Abdominal Pain    Vomiting       HPI: Cailin Almanza is a 24 y.o. male who presents with complaint of abdominal pain, nausea, vomiting, headache.  Patient states he was diagnosed with IBS for similar symptoms in the past although states over the past several days he has had worsening bilateral lower quadrant abdominal pain and discomfort as well as nausea with multiple sets of nonbloody nonbilious emesis which started today.  He also states he started develop a headache mainly on the left eye which is pulsating in nature.  Denies any vision change.  Denies any known trauma to the area.    Previous History   History reviewed. No pertinent past medical history.  History reviewed. No pertinent surgical history.  Social History     Tobacco Use    Smoking status: Never     Passive exposure: Never    Smokeless tobacco: Never   Substance Use Topics    Alcohol use: Yes    Drug use: Never     No family history on file.  No Known Allergies  Current Outpatient Medications   Medication Instructions    benzoyl peroxide 5 % gel Topical, 2 times daily, apply to affected area    cephalexin (KEFLEX) 500 mg, oral, 4 times daily    famotidine (PEPCID) 20 mg, oral, 2 times daily    omeprazole (PRILOSEC) 20 mg, oral, Daily, Do not crush or chew.    sertraline (ZOLOFT) 50 mg, oral, Daily       Physical Exam     Appearance: Alert, oriented , cooperative,  in acute distress. Well nourished & well hydrated.     Skin: Intact,  dry skin, no lesions, rash, petechiae or purpura.      Eyes: PERRLA, EOMs intact,  Conjunctiva pink with no redness or exudates. Cornea & anterior chamber are clear, Eyelids without lesions. No scleral icterus.      ENT: Hearing grossly intact. External auditory canals patent, tympanic membranes intact with visible landmarks. Nares patent, mucus membranes moist. Dentition without  lesions. Pharynx clear, uvula midline.      Neck: Supple, without meningismus. Thyroid not palpable. Trachea at midline. No lymphadenopathy.     Pulmonary: Clear bilaterally with good chest wall excursion. No rales, rhonchi or wheezing. No accessory muscle use or stridor.     Cardiac: Normal S1, S2 without murmur, rub, gallop or extrasystole. No JVD, Carotids without bruits.     Abdomen: Soft, moderate tenderness to palpation in the bilateral lower quadrants, active bowel sounds.  No palpable organomegaly.  No rebound or guarding.  No CVA tenderness.     Genitourinary: Exam deferred.     Musculoskeletal: Full range of motion. no pain, edema, or deformity. Pulses full and equal. No cyanosis or clubbing.      Neurological:  Cranial nerves II through XII are grossly intact, finger-nose touch is normal, normal sensation, no weakness, no focal findings identified.     Psychiatric: Appropriate mood and affect.      Results     Labs Reviewed   COMPREHENSIVE METABOLIC PANEL - Abnormal       Result Value    Glucose 89      Sodium 140      Potassium 4.4      Chloride 102      Bicarbonate 31      Anion Gap 11      Urea Nitrogen 12      Creatinine 1.44 (*)     eGFR 70      Calcium 10.0      Albumin 4.9      Alkaline Phosphatase 58      Total Protein 7.5      AST 17      Bilirubin, Total 1.2      ALT 13     CBC WITH AUTO DIFFERENTIAL - Abnormal    WBC 11.9 (*)     nRBC 0.0      RBC 4.97      Hemoglobin 15.9      Hematocrit 46.2      MCV 93      MCH 32.0      MCHC 34.4      RDW 12.6      Platelets 163      Neutrophils % 81.1      Immature Granulocytes %, Automated 0.4      Lymphocytes % 7.4      Monocytes % 10.8      Eosinophils % 0.1      Basophils % 0.2      Neutrophils Absolute 9.66 (*)     Immature Granulocytes Absolute, Automated 0.05      Lymphocytes Absolute 0.88 (*)     Monocytes Absolute 1.28 (*)     Eosinophils Absolute 0.01      Basophils Absolute 0.02     URINALYSIS WITH REFLEX CULTURE AND MICROSCOPIC - Abnormal     Color, Urine Colorless (*)     Appearance, Urine Clear      Specific Gravity, Urine >1.050 (*)     pH, Urine 7.0      Protein, Urine 10 (TRACE)      Glucose, Urine Normal      Blood, Urine NEGATIVE      Ketones, Urine NEGATIVE      Bilirubin, Urine NEGATIVE      Urobilinogen, Urine Normal      Nitrite, Urine NEGATIVE      Leukocyte Esterase, Urine NEGATIVE     URINALYSIS WITH REFLEX CULTURE AND MICROSCOPIC    Narrative:     The following orders were created for panel order Urinalysis with Reflex Culture and Microscopic.  Procedure                               Abnormality         Status                     ---------                               -----------         ------                     Urinalysis with Reflex C...[[id number]]  Abnormal            Final result               Extra Urine Gray Tube[[id number]]                                                         Please view results for these tests on the individual orders.   EXTRA URINE GRAY TUBE   URINALYSIS MICROSCOPIC WITH REFLEX CULTURE    WBC, Urine 1-5      RBC, Urine NONE      Squamous Epithelial Cells, Urine 1-9 (SPARSE)       CT abdomen pelvis w IV contrast   Final Result   Altered enhancement of the kidneys as described. Findings favor acute   pyelonephritis. Clinical correlation is requested.        MACRO:   Critical Finding:  See findings. Notification was initiated on   9/30/2024 at 1:48 pm by  Mahesh Smyth.  (**-OCF-**) Instructions:   Urology consult.        Signed by: Mahesh Smyth 9/30/2024 1:49 PM   Dictation workstation:   [id number]            ED Course & Medical Decision Making     Medications   sodium chloride 0.9 % bolus 1,000 mL (0 mL intravenous Stopped 9/30/24 1323)   ketorolac (Toradol) injection 30 mg (30 mg intravenous Given 9/30/24 1137)   diphenhydrAMINE (BENADryl) injection 25 mg (25 mg intravenous Given 9/30/24 1138)   metoclopramide (Reglan) injection 10 mg (10 mg intravenous Given 9/30/24 1137)   iohexol (OMNIPaque) 350 mg  iodine/mL solution 75 mL (75 mL intravenous Given 9/30/24 1310)   cefTRIAXone (Rocephin) in dextrose 5% IV 2 g (0 g intravenous Stopped 9/30/24 1700)     Diagnoses as of 10/09/24 1929   Pyelonephritis   MINA (acute kidney injury) (CMS-HCC)   Other migraine without status migrainosus, not intractable           Patient did initially present hemodynamically stable and afebrile.  Vital signs reviewed.  Differential includes was not limited to acute pyelonephritis, nephrolithiasis, other intra-abdominal pathology.  Also could be related to musculoskeletal pain.  Findings also consistent with migraine headache.  Initial lab work completed which did show elevated creatinine 1.44 consistent with acute kidney injury of unclear etiology.  Otherwise LFTs normal.  Mild leukocytosis 11.9.  Left shift noted.  Urinalysis was otherwise nonacute.  CT scan the abdomen pelvis did show nonspecific findings with enhancement of both kidneys which could be consistent with pyelonephritis although given his negative urinalysis I am unclear of these findings at this time.  Given his MINA and leukocytosis he will be treated for possible pyelonephritis.  He was given a dose of IV Rocephin and did receive IV 1 L normal saline, Toradol, Benadryl, and Reglan for treatment of his headache.  He did have significant relief and did request to be discharged home.  Vitals remained stable.  He was given 10 days of Keflex for home treatment.    Procedures   Procedures    Diagnosis     1. Pyelonephritis    2. MINA (acute kidney injury) (CMS-HCC)    3. Other migraine without status migrainosus, not intractable        Disposition     DISCHARGE.  The patient is discharged back to their place of residence.  Discharge diagnosis, instructions and plan were discussed and understood. At the time of discharge the patient was comfortable and was in no apparent distress. Patient is aware of diagnostic uncertainty and was notified though testing is negative here, there  is a very small chance that pathology may be missed.  The patient understands these risks and the patient /family understood to return immediately to the emergency department if the symptoms worsen or if they have any additional concerns.    FOLLOW UP  Primary care provider in 1-2 days.        ED Prescriptions       Medication Sig Dispense Start Date End Date Auth. Provider    cephalexin (Keflex) 500 mg capsule Take 1 capsule (500 mg) by mouth 4 times a day for 10 days. 40 capsule 9/30/2024 10/10/2024 DO Niall Driver DO  10/09/24 1932

## 2024-10-09 ENCOUNTER — HOSPITAL ENCOUNTER (EMERGENCY)
Facility: HOSPITAL | Age: 24
Discharge: HOME | End: 2024-10-10
Attending: EMERGENCY MEDICINE
Payer: COMMERCIAL

## 2024-10-09 ENCOUNTER — APPOINTMENT (OUTPATIENT)
Dept: RADIOLOGY | Facility: HOSPITAL | Age: 24
End: 2024-10-09
Payer: COMMERCIAL

## 2024-10-09 ENCOUNTER — APPOINTMENT (OUTPATIENT)
Dept: PRIMARY CARE | Facility: CLINIC | Age: 24
End: 2024-10-09
Payer: COMMERCIAL

## 2024-10-09 VITALS
WEIGHT: 140 LBS | DIASTOLIC BLOOD PRESSURE: 75 MMHG | HEART RATE: 91 BPM | BODY MASS INDEX: 17.97 KG/M2 | SYSTOLIC BLOOD PRESSURE: 115 MMHG

## 2024-10-09 DIAGNOSIS — F41.9 ANXIETY: ICD-10-CM

## 2024-10-09 DIAGNOSIS — M54.9 ACUTE BILATERAL BACK PAIN, UNSPECIFIED BACK LOCATION: Primary | ICD-10-CM

## 2024-10-09 DIAGNOSIS — N12 PYELONEPHRITIS: Primary | ICD-10-CM

## 2024-10-09 DIAGNOSIS — K59.03 DRUG-INDUCED CONSTIPATION: ICD-10-CM

## 2024-10-09 DIAGNOSIS — L70.0 ACNE VULGARIS: ICD-10-CM

## 2024-10-09 LAB
ALBUMIN SERPL BCP-MCNC: 4.8 G/DL (ref 3.4–5)
ALP SERPL-CCNC: 54 U/L (ref 33–120)
ALT SERPL W P-5'-P-CCNC: 33 U/L (ref 10–52)
ANION GAP SERPL CALC-SCNC: 12 MMOL/L (ref 10–20)
AST SERPL W P-5'-P-CCNC: 25 U/L (ref 9–39)
BASOPHILS # BLD AUTO: 0.02 X10*3/UL (ref 0–0.1)
BASOPHILS NFR BLD AUTO: 0.3 %
BILIRUB SERPL-MCNC: 0.8 MG/DL (ref 0–1.2)
BUN SERPL-MCNC: 14 MG/DL (ref 6–23)
CALCIUM SERPL-MCNC: 9.4 MG/DL (ref 8.6–10.3)
CARDIAC TROPONIN I PNL SERPL HS: <3 NG/L (ref 0–20)
CHLORIDE SERPL-SCNC: 102 MMOL/L (ref 98–107)
CO2 SERPL-SCNC: 29 MMOL/L (ref 21–32)
CREAT SERPL-MCNC: 1.01 MG/DL (ref 0.5–1.3)
EGFRCR SERPLBLD CKD-EPI 2021: >90 ML/MIN/1.73M*2
EOSINOPHIL # BLD AUTO: 0.03 X10*3/UL (ref 0–0.7)
EOSINOPHIL NFR BLD AUTO: 0.4 %
ERYTHROCYTE [DISTWIDTH] IN BLOOD BY AUTOMATED COUNT: 12.6 % (ref 11.5–14.5)
GLUCOSE SERPL-MCNC: 88 MG/DL (ref 74–99)
HCT VFR BLD AUTO: 42.7 % (ref 41–52)
HGB BLD-MCNC: 14.8 G/DL (ref 13.5–17.5)
IMM GRANULOCYTES # BLD AUTO: 0.03 X10*3/UL (ref 0–0.7)
IMM GRANULOCYTES NFR BLD AUTO: 0.4 % (ref 0–0.9)
LYMPHOCYTES # BLD AUTO: 2.24 X10*3/UL (ref 1.2–4.8)
LYMPHOCYTES NFR BLD AUTO: 30.4 %
MCH RBC QN AUTO: 32 PG (ref 26–34)
MCHC RBC AUTO-ENTMCNC: 34.7 G/DL (ref 32–36)
MCV RBC AUTO: 92 FL (ref 80–100)
MONOCYTES # BLD AUTO: 0.76 X10*3/UL (ref 0.1–1)
MONOCYTES NFR BLD AUTO: 10.3 %
NEUTROPHILS # BLD AUTO: 4.3 X10*3/UL (ref 1.2–7.7)
NEUTROPHILS NFR BLD AUTO: 58.2 %
NRBC BLD-RTO: 0 /100 WBCS (ref 0–0)
PLATELET # BLD AUTO: 177 X10*3/UL (ref 150–450)
POTASSIUM SERPL-SCNC: 3.9 MMOL/L (ref 3.5–5.3)
PROT SERPL-MCNC: 7.4 G/DL (ref 6.4–8.2)
RBC # BLD AUTO: 4.63 X10*6/UL (ref 4.5–5.9)
SODIUM SERPL-SCNC: 139 MMOL/L (ref 136–145)
WBC # BLD AUTO: 7.4 X10*3/UL (ref 4.4–11.3)

## 2024-10-09 PROCEDURE — 1036F TOBACCO NON-USER: CPT | Performed by: INTERNAL MEDICINE

## 2024-10-09 PROCEDURE — 71045 X-RAY EXAM CHEST 1 VIEW: CPT

## 2024-10-09 PROCEDURE — 99284 EMERGENCY DEPT VISIT MOD MDM: CPT | Mod: 25

## 2024-10-09 PROCEDURE — 96374 THER/PROPH/DIAG INJ IV PUSH: CPT

## 2024-10-09 PROCEDURE — 84484 ASSAY OF TROPONIN QUANT: CPT | Performed by: EMERGENCY MEDICINE

## 2024-10-09 PROCEDURE — 96375 TX/PRO/DX INJ NEW DRUG ADDON: CPT

## 2024-10-09 PROCEDURE — 36415 COLL VENOUS BLD VENIPUNCTURE: CPT | Performed by: EMERGENCY MEDICINE

## 2024-10-09 PROCEDURE — 84075 ASSAY ALKALINE PHOSPHATASE: CPT | Performed by: EMERGENCY MEDICINE

## 2024-10-09 PROCEDURE — 2500000004 HC RX 250 GENERAL PHARMACY W/ HCPCS (ALT 636 FOR OP/ED): Performed by: EMERGENCY MEDICINE

## 2024-10-09 PROCEDURE — 71045 X-RAY EXAM CHEST 1 VIEW: CPT | Mod: FOREIGN READ | Performed by: RADIOLOGY

## 2024-10-09 PROCEDURE — 85025 COMPLETE CBC W/AUTO DIFF WBC: CPT | Performed by: EMERGENCY MEDICINE

## 2024-10-09 PROCEDURE — 81003 URINALYSIS AUTO W/O SCOPE: CPT | Performed by: EMERGENCY MEDICINE

## 2024-10-09 PROCEDURE — 99214 OFFICE O/P EST MOD 30 MIN: CPT | Performed by: INTERNAL MEDICINE

## 2024-10-09 RX ORDER — SERTRALINE HYDROCHLORIDE 50 MG/1
50 TABLET, FILM COATED ORAL DAILY
Qty: 30 TABLET | Refills: 0 | Status: SHIPPED | OUTPATIENT
Start: 2024-10-09 | End: 2024-11-08

## 2024-10-09 RX ORDER — KETOROLAC TROMETHAMINE 30 MG/ML
15 INJECTION, SOLUTION INTRAMUSCULAR; INTRAVENOUS ONCE
Status: COMPLETED | OUTPATIENT
Start: 2024-10-09 | End: 2024-10-09

## 2024-10-09 RX ORDER — BENZOYL PEROXIDE 5 G/100G
GEL TOPICAL 2 TIMES DAILY
Qty: 60 G | Refills: 2 | Status: SHIPPED | OUTPATIENT
Start: 2024-10-09 | End: 2025-02-06

## 2024-10-09 RX ORDER — ONDANSETRON HYDROCHLORIDE 2 MG/ML
4 INJECTION, SOLUTION INTRAVENOUS ONCE
Status: COMPLETED | OUTPATIENT
Start: 2024-10-09 | End: 2024-10-09

## 2024-10-09 ASSESSMENT — PAIN DESCRIPTION - PAIN TYPE: TYPE: ACUTE PAIN

## 2024-10-09 ASSESSMENT — PAIN DESCRIPTION - LOCATION
LOCATION: ABDOMEN
LOCATION: ABDOMEN

## 2024-10-09 ASSESSMENT — PAIN DESCRIPTION - DESCRIPTORS: DESCRIPTORS: OTHER (COMMENT);PRESSURE

## 2024-10-09 ASSESSMENT — PAIN SCALES - GENERAL
PAINLEVEL_OUTOF10: 2
PAINLEVEL_OUTOF10: 2

## 2024-10-09 NOTE — PROGRESS NOTES
Subjective   Patient ID: Cailin Almanza is a 24 y.o. male who presents for Follow-up and UTI (Follow up to [facility]).    Dysuria          Patient is a 24-year-old male with past medical history of ADHD who presents as follow-up.     Patient recently admitted to the hospital for abdominal pain.  He went 3 times.  Labs and urinalysis were unremarkable but CT scan showed evidence of pyelonephritis.  Patient was treated with antibiotics however pain is still present.  He denies any burning on urination or blood in the urine.  No fevers or worsening back pain.  He is quite anxious about his symptoms.  He is very concerned about the fact that he even developed an infection in the first place.    He is perseverating on these symptoms.  He states that a lot of times he spends a lot of time in his room having panic attacks.  No changes in eating habits.  He states really the symptoms of his anxiety have been present for years and have gone untreated.      Review of Systems  Constitutional: No fever or chills  Cardiovascular: no chest pain, no palpitations and no syncope.   Respiratory: no cough, no shortness of breath during exertion and no shortness of breath at rest.   Gastrointestinal: no abdominal pain, no nausea and no vomiting.  Neuro: No Headache, no dizziness    Objective   /75   Pulse 91   Wt 63.5 kg (140 lb)   BMI 17.97 kg/m²     Physical Exam  Constitutional: Alert and in no acute distress. Well developed, well nourished  Head and Face: Head and face: Normal.    Cardiovascular: Heart rate and rhythm were normal, normal S1 and S2. No peripheral edema.   Pulmonary: No respiratory distress. Clear bilateral breath sounds.  Musculoskeletal: Gait and station: Normal. Muscle strength/tone: Normal.   Skin: Normal skin color and pigmentation, normal skin turgor, and no rash.    Psychiatric: Judgment and insight: Intact. Mood and affect: Normal.        Lab Results   Component Value Date    WBC 11.9 (H) 09/30/2024     HGB 15.9 09/30/2024    HCT 46.2 09/30/2024     09/30/2024    ALT 13 09/30/2024    AST 17 09/30/2024     09/30/2024    K 4.4 09/30/2024     09/30/2024    CREATININE 1.44 (H) 09/30/2024    BUN 12 09/30/2024    CO2 31 09/30/2024       CT abdomen pelvis w IV contrast  Narrative: Interpreted By:  Mahesh Smyth,   STUDY:  CT ABDOMEN PELVIS W IV CONTRAST;  9/30/2024 1:13 pm      INDICATION:  Worsening bilateral lower quadrant abdominal pain with nausea and  non-bloody non-bilious emesis.      COMPARISON:  03/24/2020      ACCESSION NUMBER(S):  [id number]      ORDERING CLINICIAN:  SEVEN GONZALEZ      TECHNIQUE:  CT of the abdomen and pelvis was performed.  Standard contiguous  axial images were obtained at 3 mm slice thickness through the  abdomen and pelvis. Coronal and sagittal reconstructions at 3 mm  slice thickness were generated and submitted for review.      75 ml of contrast Omnipaque 350 were administered intravenously  without immediate complication.      FINDINGS:  LOWER CHEST:  Within normal limits.      ABDOMEN:      LIVER:  Within normal limits.      BILE DUCTS:  Normal caliber.      GALLBLADDER:  No calcified stones. No wall thickening.      PANCREAS:  Within normal limits.      SPLEEN:  Within normal limits.      ADRENAL GLANDS:  Within normal limits.      KIDNEYS AND URETERS:  Asymmetrically heterogeneous enhancement both kidneys with several  areas of hypoenhancement, more numerous on the right. Findings would  be compatible with pyelonephritis. No fluid collection. No  hydronephrosis.      PELVIS:      BLADDER:  Within normal limits.      REPRODUCTIVE ORGANS:  No pelvic masses.      BOWEL:  The stomach, small and large bowel are normal in appearance without  wall thickening or obstruction.      VESSELS:  There is no aneurysmal dilatation of the abdominal aorta. The IVC  appears normal.      PERITONEUM/RETROPERITONEUM/LYMPH NODES:  No ascites or free air, no fluid collection.  No  abdominopelvic  lymphadenopathy is present.      BONES AND ABDOMINAL WALL:  No suspicious osseous lesions are identified.  The abdominal wall  soft tissues appear normal.      Impression: Altered enhancement of the kidneys as described. Findings favor acute  pyelonephritis. Clinical correlation is requested.      MACRO:  Critical Finding:  See findings. Notification was initiated on  9/30/2024 at 1:48 pm by  Mahesh Smyth.  (**-OCF-**) Instructions:  Urology consult.      Signed by: Mahesh Smyth 9/30/2024 1:49 PM  Dictation workstation:   [id number]            Assessment/Plan   Problem List Items Addressed This Visit       Acne vulgaris    Relevant Medications    benzoyl peroxide 5 % gel     Other Visit Diagnoses       Pyelonephritis    -  Primary    Relevant Orders    US renal complete    Drug-induced constipation        Relevant Orders    Referral to Gastroenterology    Anxiety        Relevant Medications    sertraline (Zoloft) 50 mg tablet    Other Relevant Orders    Follow Up In [facility] - PCP - Established          Unclear whether or not he truly had pyelonephritis especially considering negative urinalysis and renal function.  Will check kidney ultrasound for further evaluation.  Patient to complete course of antibiotics.    With regards to ongoing anxiety will start sertraline to 50 mg once daily.  Follow-up 30 days for reevaluation.

## 2024-10-10 VITALS
WEIGHT: 140 LBS | TEMPERATURE: 97.5 F | HEART RATE: 60 BPM | RESPIRATION RATE: 17 BRPM | DIASTOLIC BLOOD PRESSURE: 88 MMHG | OXYGEN SATURATION: 100 % | SYSTOLIC BLOOD PRESSURE: 127 MMHG | BODY MASS INDEX: 17.41 KG/M2 | HEIGHT: 75 IN

## 2024-10-10 LAB
APPEARANCE UR: CLEAR
BILIRUB UR STRIP.AUTO-MCNC: NEGATIVE MG/DL
CARDIAC TROPONIN I PNL SERPL HS: <3 NG/L (ref 0–20)
COLOR UR: ABNORMAL
GLUCOSE UR STRIP.AUTO-MCNC: NORMAL MG/DL
KETONES UR STRIP.AUTO-MCNC: ABNORMAL MG/DL
LEUKOCYTE ESTERASE UR QL STRIP.AUTO: NEGATIVE
NITRITE UR QL STRIP.AUTO: NEGATIVE
PH UR STRIP.AUTO: 7 [PH]
PROT UR STRIP.AUTO-MCNC: NEGATIVE MG/DL
RBC # UR STRIP.AUTO: NEGATIVE /UL
SP GR UR STRIP.AUTO: 1.02
UROBILINOGEN UR STRIP.AUTO-MCNC: NORMAL MG/DL

## 2024-10-10 ASSESSMENT — PAIN DESCRIPTION - LOCATION: LOCATION: ABDOMEN

## 2024-10-10 ASSESSMENT — PAIN DESCRIPTION - PAIN TYPE: TYPE: ACUTE PAIN

## 2024-10-10 ASSESSMENT — PAIN SCALES - GENERAL
PAINLEVEL_OUTOF10: 0 - NO PAIN
PAINLEVEL_OUTOF10: 0 - NO PAIN
PAINLEVEL_OUTOF10: 3

## 2024-10-10 ASSESSMENT — PAIN - FUNCTIONAL ASSESSMENT
PAIN_FUNCTIONAL_ASSESSMENT: 0-10
PAIN_FUNCTIONAL_ASSESSMENT: 0-10

## 2024-10-10 NOTE — ED TRIAGE NOTES
"Patient presents to the ED complaining of \"sharp\" Left Flank/Lower Back pain that has been ongoing for the past x1 week. He was recently diagnosed with Polynephritis. The Patient is also complaining of Centralized Chest pain (\"tightness\"/\"Pressure\") that has been ongoing for the past \"1-2 days\".  "

## 2024-10-10 NOTE — ED TRIAGE NOTES
TRIAGE NOTE   I saw the patient as the Clinician in Triage and performed a brief history and physical exam, established acuity, and ordered appropriate tests to develop basic plan of care. Patient will be seen by an JOJO, resident and/or physician who will independently evaluate the patient. Please see subsequent provider notes for further details and disposition.     Brief HPI: In brief, Cailin Almanza is a 24 y.o. male that presents for right sided low back pain and cough with chest discomfort.  Negative work-up for uti in the past but treated empirically by pmd.  Concerned this has recurred.  Also with right sided chest discomfort + cough.  + marijuana usage no hx of asthma     Focused Physical exam:   Heart RRR  Lungs CTAB  Abd soft non-tender     Plan/MDM:   Iv labs troponin EKG chest x-ray ua     Please see subsequent provider note for further details and disposition

## 2024-10-10 NOTE — DISCHARGE INSTRUCTIONS
You were seen in the ED for back pain in the setting of recent pyelonephritis (kidney infection).  EKG, chest xray, labs and urine are reassuring.  Your kidney infection is clearing up.  Please finish taking the full bottle of antibiotics.  You can take tylenol and ibuprofen for your back pain.  Please follow up with your PCP in 2-3 days if you are not feeling better. Return to the ED for severe pain or further concerns.

## 2024-10-13 NOTE — ED PROVIDER NOTES
HPI   Chief Complaint   Patient presents with    Flank Pain    Back Pain    Chest Pain       This is a 24yM recent pyelonephritis still on antibiotics who presents with back pain.  Patient reports mild back pain and flank pain that started after his symptoms had improved from his pyelo last week.  No fever, n/v/d or abd pain.  Some chest pain, mild, nonexertional, no SOB, URI, cough.  He is tolerating PO.  Pt asking to be checked to make sure he doesn't have pyelo again/still.              Patient History   No past medical history on file.  No past surgical history on file.  No family history on file.  Social History     Tobacco Use    Smoking status: Never     Passive exposure: Never    Smokeless tobacco: Never   Substance Use Topics    Alcohol use: Yes    Drug use: Never       Physical Exam   ED Triage Vitals [10/09/24 2353]   Temperature Heart Rate Respirations BP   36.4 °C (97.5 °F) 71 16 117/80      Pulse Ox Temp Source Heart Rate Source Patient Position   99 % Temporal Monitor Sitting      BP Location FiO2 (%)     Right arm --       Physical Exam  Vitals and nursing note reviewed.   Constitutional:       General: He is not in acute distress.     Appearance: He is normal weight. He is not ill-appearing, toxic-appearing or diaphoretic.   HENT:      Head: Normocephalic and atraumatic.   Eyes:      Extraocular Movements: Extraocular movements intact.   Cardiovascular:      Rate and Rhythm: Normal rate and regular rhythm.      Heart sounds: Heart sounds not distant.      No systolic murmur is present.      No diastolic murmur is present.      No friction rub. No gallop.   Pulmonary:      Effort: No tachypnea, accessory muscle usage or respiratory distress.      Breath sounds: No stridor. No decreased breath sounds, wheezing, rhonchi or rales.   Chest:      Chest wall: No mass, deformity, tenderness or edema.   Abdominal:      Palpations: Abdomen is soft.      Tenderness: There is no abdominal tenderness. There is  no guarding or rebound.   Musculoskeletal:         General: Normal range of motion.      Cervical back: Normal range of motion and neck supple.      Right lower leg: No tenderness. No edema.      Left lower leg: No tenderness. No edema.   Skin:     General: Skin is warm and dry.      Coloration: Skin is not cyanotic or pale.      Findings: No ecchymosis, erythema or rash.      Nails: There is no clubbing.   Neurological:      General: No focal deficit present.      Mental Status: He is alert and oriented to person, place, and time.      Cranial Nerves: No cranial nerve deficit.      Motor: No weakness.   Psychiatric:         Mood and Affect: Mood normal.         Behavior: Behavior normal. Behavior is not agitated.           ED Course & MDM   ED Course as of 10/13/24 1745   Thu Oct 10, 2024   0053 Labs reviewed, reassuring, no leukocytosis or anemia, normal electrolytes and renal function, negative troponin, UA negative [EK]   0053 Chest x-ray interpreted by without pneumothorax or pneumonia [EK]   0054 EKG interpreted by me normal sinus rhythm with sinus arrhythmia no ischemic changes [EK]      ED Course User Index  [EK] Elyse H Klerman, MD         Diagnoses as of 10/13/24 1745   Acute bilateral back pain, unspecified back location                 No data recorded     Tammy Coma Scale Score: 15 (10/10/24 0000 : Nai Worrell RN)                           Medical Decision Making  24yM recent pyelonephritis presents with back pain and chest pain.  Patient well-appearing, hemodynamic stable mildly hypertensive but without any respiratory distress.  Abdomen soft/benign with no CVA tenderness.  EKG without ischemia or arrhythmia.  Chest x-ray without pneumothorax or pneumonia.  UA without UTI.  Labs reviewed, reassuring, with normal electrolytes and renal function, no leukocytosis or anemia, negative troponin x 2.  No clinical concern for DVT/PE and patient is PERC negative.  Results reviewed with patient.   Recommend supportive care, outpatient PCP follow-up, return precautions.    Amount and/or Complexity of Data Reviewed  External Data Reviewed: labs.  Labs: ordered. Decision-making details documented in ED Course.  Radiology: ordered and independent interpretation performed. Decision-making details documented in ED Course.  ECG/medicine tests: ordered and independent interpretation performed. Decision-making details documented in ED Course.        Procedure  Procedures     Elyse H Klerman, MD  10/13/24 4659

## 2024-10-14 ENCOUNTER — HOSPITAL ENCOUNTER (EMERGENCY)
Facility: HOSPITAL | Age: 24
Discharge: HOME | End: 2024-10-15
Attending: STUDENT IN AN ORGANIZED HEALTH CARE EDUCATION/TRAINING PROGRAM
Payer: COMMERCIAL

## 2024-10-14 VITALS
SYSTOLIC BLOOD PRESSURE: 105 MMHG | HEART RATE: 89 BPM | RESPIRATION RATE: 16 BRPM | HEIGHT: 74 IN | OXYGEN SATURATION: 98 % | DIASTOLIC BLOOD PRESSURE: 69 MMHG | BODY MASS INDEX: 17.97 KG/M2 | WEIGHT: 140 LBS | TEMPERATURE: 97.3 F

## 2024-10-14 DIAGNOSIS — R52 BODY ACHES: Primary | ICD-10-CM

## 2024-10-14 DIAGNOSIS — F41.8 ANXIETY ABOUT HEALTH: ICD-10-CM

## 2024-10-14 LAB
ALBUMIN SERPL BCP-MCNC: 5 G/DL (ref 3.4–5)
ALP SERPL-CCNC: 63 U/L (ref 33–120)
ALT SERPL W P-5'-P-CCNC: 17 U/L (ref 10–52)
ANION GAP SERPL CALC-SCNC: 13 MMOL/L (ref 10–20)
APPEARANCE UR: CLEAR
AST SERPL W P-5'-P-CCNC: 14 U/L (ref 9–39)
BASOPHILS # BLD AUTO: 0.02 X10*3/UL (ref 0–0.1)
BASOPHILS NFR BLD AUTO: 0.2 %
BILIRUB SERPL-MCNC: 1.2 MG/DL (ref 0–1.2)
BILIRUB UR STRIP.AUTO-MCNC: NEGATIVE MG/DL
BUN SERPL-MCNC: 21 MG/DL (ref 6–23)
CALCIUM SERPL-MCNC: 10 MG/DL (ref 8.6–10.3)
CHLORIDE SERPL-SCNC: 96 MMOL/L (ref 98–107)
CO2 SERPL-SCNC: 32 MMOL/L (ref 21–32)
COLOR UR: YELLOW
CREAT SERPL-MCNC: 1.14 MG/DL (ref 0.5–1.3)
EGFRCR SERPLBLD CKD-EPI 2021: >90 ML/MIN/1.73M*2
EOSINOPHIL # BLD AUTO: 0.02 X10*3/UL (ref 0–0.7)
EOSINOPHIL NFR BLD AUTO: 0.2 %
ERYTHROCYTE [DISTWIDTH] IN BLOOD BY AUTOMATED COUNT: 12.6 % (ref 11.5–14.5)
GLUCOSE SERPL-MCNC: 79 MG/DL (ref 74–99)
GLUCOSE UR STRIP.AUTO-MCNC: NORMAL MG/DL
HCT VFR BLD AUTO: 45.5 % (ref 41–52)
HGB BLD-MCNC: 15.6 G/DL (ref 13.5–17.5)
IMM GRANULOCYTES # BLD AUTO: 0.04 X10*3/UL (ref 0–0.7)
IMM GRANULOCYTES NFR BLD AUTO: 0.3 % (ref 0–0.9)
KETONES UR STRIP.AUTO-MCNC: ABNORMAL MG/DL
LEUKOCYTE ESTERASE UR QL STRIP.AUTO: NEGATIVE
LYMPHOCYTES # BLD AUTO: 3.13 X10*3/UL (ref 1.2–4.8)
LYMPHOCYTES NFR BLD AUTO: 25.1 %
MCH RBC QN AUTO: 32 PG (ref 26–34)
MCHC RBC AUTO-ENTMCNC: 34.3 G/DL (ref 32–36)
MCV RBC AUTO: 93 FL (ref 80–100)
MONOCYTES # BLD AUTO: 1.26 X10*3/UL (ref 0.1–1)
MONOCYTES NFR BLD AUTO: 10.1 %
MUCOUS THREADS #/AREA URNS AUTO: NORMAL /LPF
NEUTROPHILS # BLD AUTO: 8.02 X10*3/UL (ref 1.2–7.7)
NEUTROPHILS NFR BLD AUTO: 64.1 %
NITRITE UR QL STRIP.AUTO: NEGATIVE
NRBC BLD-RTO: 0 /100 WBCS (ref 0–0)
PH UR STRIP.AUTO: 6 [PH]
PLATELET # BLD AUTO: 195 X10*3/UL (ref 150–450)
POTASSIUM SERPL-SCNC: 3.7 MMOL/L (ref 3.5–5.3)
PROT SERPL-MCNC: 7.8 G/DL (ref 6.4–8.2)
PROT UR STRIP.AUTO-MCNC: ABNORMAL MG/DL
RBC # BLD AUTO: 4.87 X10*6/UL (ref 4.5–5.9)
RBC # UR STRIP.AUTO: NEGATIVE /UL
RBC #/AREA URNS AUTO: NORMAL /HPF
SODIUM SERPL-SCNC: 137 MMOL/L (ref 136–145)
SP GR UR STRIP.AUTO: 1.03
UROBILINOGEN UR STRIP.AUTO-MCNC: ABNORMAL MG/DL
WBC # BLD AUTO: 12.5 X10*3/UL (ref 4.4–11.3)
WBC #/AREA URNS AUTO: NORMAL /HPF

## 2024-10-14 PROCEDURE — 85025 COMPLETE CBC W/AUTO DIFF WBC: CPT | Performed by: STUDENT IN AN ORGANIZED HEALTH CARE EDUCATION/TRAINING PROGRAM

## 2024-10-14 PROCEDURE — 99283 EMERGENCY DEPT VISIT LOW MDM: CPT

## 2024-10-14 PROCEDURE — 36415 COLL VENOUS BLD VENIPUNCTURE: CPT | Performed by: STUDENT IN AN ORGANIZED HEALTH CARE EDUCATION/TRAINING PROGRAM

## 2024-10-14 PROCEDURE — 2500000001 HC RX 250 WO HCPCS SELF ADMINISTERED DRUGS (ALT 637 FOR MEDICARE OP): Performed by: STUDENT IN AN ORGANIZED HEALTH CARE EDUCATION/TRAINING PROGRAM

## 2024-10-14 PROCEDURE — 81001 URINALYSIS AUTO W/SCOPE: CPT | Performed by: STUDENT IN AN ORGANIZED HEALTH CARE EDUCATION/TRAINING PROGRAM

## 2024-10-14 PROCEDURE — 80053 COMPREHEN METABOLIC PANEL: CPT | Performed by: STUDENT IN AN ORGANIZED HEALTH CARE EDUCATION/TRAINING PROGRAM

## 2024-10-14 RX ORDER — IBUPROFEN 400 MG/1
800 TABLET ORAL ONCE
Status: COMPLETED | OUTPATIENT
Start: 2024-10-14 | End: 2024-10-14

## 2024-10-14 RX ORDER — IBUPROFEN 600 MG/1
600 TABLET ORAL EVERY 6 HOURS PRN
Qty: 28 TABLET | Refills: 0 | Status: SHIPPED | OUTPATIENT
Start: 2024-10-14 | End: 2024-10-21

## 2024-10-14 RX ADMIN — IBUPROFEN 800 MG: 400 TABLET, FILM COATED ORAL at 22:08

## 2024-10-14 SDOH — HEALTH STABILITY: MENTAL HEALTH: BEHAVIORAL HEALTH(WDL): EXCEPTIONS TO WDL

## 2024-10-14 SDOH — HEALTH STABILITY: MENTAL HEALTH

## 2024-10-14 SDOH — HEALTH STABILITY: MENTAL HEALTH: HAVE YOU WISHED YOU WERE DEAD OR WISHED YOU COULD GO TO SLEEP AND NOT WAKE UP?: NO

## 2024-10-14 SDOH — HEALTH STABILITY: MENTAL HEALTH: BEHAVIORS/MOOD: ANXIOUS;CALM;COOPERATIVE;PLEASANT;FEARFUL

## 2024-10-14 SDOH — HEALTH STABILITY: MENTAL HEALTH: NEEDS EXPRESSED: PHYSICAL

## 2024-10-14 SDOH — HEALTH STABILITY: MENTAL HEALTH: CONTENT: UNREMARKABLE

## 2024-10-14 SDOH — HEALTH STABILITY: MENTAL HEALTH: HAVE YOU ACTUALLY HAD ANY THOUGHTS OF KILLING YOURSELF?: NO

## 2024-10-14 SDOH — HEALTH STABILITY: MENTAL HEALTH: HAVE YOU EVER DONE ANYTHING, STARTED TO DO ANYTHING, OR PREPARED TO DO ANYTHING TO END YOUR LIFE?: NO

## 2024-10-14 ASSESSMENT — PAIN - FUNCTIONAL ASSESSMENT: PAIN_FUNCTIONAL_ASSESSMENT: 0-10

## 2024-10-14 ASSESSMENT — PAIN DESCRIPTION - DESCRIPTORS: DESCRIPTORS: ACHING

## 2024-10-14 ASSESSMENT — COLUMBIA-SUICIDE SEVERITY RATING SCALE - C-SSRS
6. HAVE YOU EVER DONE ANYTHING, STARTED TO DO ANYTHING, OR PREPARED TO DO ANYTHING TO END YOUR LIFE?: NO
2. HAVE YOU ACTUALLY HAD ANY THOUGHTS OF KILLING YOURSELF?: NO
1. IN THE PAST MONTH, HAVE YOU WISHED YOU WERE DEAD OR WISHED YOU COULD GO TO SLEEP AND NOT WAKE UP?: NO

## 2024-10-14 ASSESSMENT — PAIN SCALES - GENERAL
PAINLEVEL_OUTOF10: 3
PAINLEVEL_OUTOF10: 4

## 2024-10-14 ASSESSMENT — PAIN DESCRIPTION - LOCATION: LOCATION: HEAD

## 2024-10-15 NOTE — ED NOTES
"Assumed patient care. Pt is calm and cooperative. Pt denies SI/HI. Pt states he was here recently for a kidney infection and on an antibiotic. Pt also complaining of headaches. Pt took tylenol, which did not help. Pt denies delusions or hallucinations. Reported \"paranoia\" due to medical complaints and just wants to make sure nothing is wrong with him. This is not actual paranoia. Pt does not believe anyone is out to get him. Pt is a+Ox4.      Clau Post RN  10/14/24 2130    "

## 2024-10-15 NOTE — ED TRIAGE NOTES
"Pt reports headaches since start ATB 1 week ago     reports \" feeling paranoid\".  Denies any SI or HI but wants to speak to someone about his paranoid thoughts,      for reports continues back pain from recent   kidney infection.idney infection,   "

## 2024-10-15 NOTE — ED PROVIDER NOTES
"HPI   Chief Complaint   Patient presents with    Headache     Pt reports headaches since start ATB 1 week ago for kidney infection,     Paranoia      reports \" feeling paranoid\".  Denies any SI or HI but wants to speak to someone about his paranoid thoughts,       Back Pain      reports continues back pain from recent   kidney infection.       23 yo M presents with body aches, headache, and anxiety about his health. He was recently treated for a UTI and completed the course of antibiotics. He says that every now and then when he is up and walking around and working he'll feel more muscle aches and pains throughout his body that will resolve in the morning. He states he has had a mild frontal headache for the past few days. He has not taken any medications for his headache. He has a history of similar headaches in the past. He states he feels very anxious about his health in general, and he has been to the emergency department three times over the past week for various complaints. He denies fevers, chills, cough, CP, SOB, abdominal pain, N/V/D, dysuria, hematuria, testicular pain, back or flank pain, vision changes, dizziness, syncope, suicidal ideation, homicidal ideation, drug or alcohol use, visual or auditory hallucinations, thoughts of persecution.              Patient History   No past medical history on file.  No past surgical history on file.  No family history on file.  Social History     Tobacco Use    Smoking status: Never     Passive exposure: Never    Smokeless tobacco: Never   Substance Use Topics    Alcohol use: Yes    Drug use: Never       Physical Exam   ED Triage Vitals [10/14/24 2040]   Temperature Heart Rate Respirations BP   36.3 °C (97.3 °F) 89 16 105/69      Pulse Ox Temp Source Heart Rate Source Patient Position   98 % Temporal Monitor --      BP Location FiO2 (%)     -- --       Physical Exam  Vitals reviewed.   Constitutional:       Appearance: He is not toxic-appearing.   Eyes:      Pupils: " Pupils are equal, round, and reactive to light.   Cardiovascular:      Rate and Rhythm: Normal rate and regular rhythm.   Pulmonary:      Effort: Pulmonary effort is normal.      Breath sounds: Normal breath sounds. No stridor. No wheezing, rhonchi or rales.   Abdominal:      Palpations: Abdomen is soft.      Tenderness: There is no abdominal tenderness. There is no guarding.   Musculoskeletal:         General: Normal range of motion.      Cervical back: Normal range of motion and neck supple.   Skin:     General: Skin is warm and dry.      Capillary Refill: Capillary refill takes less than 2 seconds.   Neurological:      Mental Status: He is alert and oriented to person, place, and time.   Psychiatric:         Mood and Affect: Mood is anxious.           ED Course & MDM   ED Course as of 11/10/24 0429   Mon Oct 14, 2024   2129 On abilify and zoloft [JG]      ED Course User Index  [JG] Bernadette Mix MD         Diagnoses as of 11/10/24 0429   Body aches   Anxiety about health                 No data recorded     [place] Coma Scale Score: 15 (10/14/24 2133 : Clau Post RN)                           Medical Decision Making  23 yo M presents with body aches, headache, and anxiety about his health. He was recently treated for a UTI and completed the course of antibiotics. On exam, patient well appearing but does appear anxious. Physical examination is unremarkable, he is A&Ox4, lungs CTAB, abdomen soft and nontender.  He has no focal neurologic deficits on examination.  Ambulating with a steady gait without assistance.  Negative Romberg.  No pronator drift.  No family history or personal history of aneurysm, no sudden onset thunderclap headache.  Vital signs are stable.  Nontachycardic, nontachypneic, normotensive, afebrile.  SpO2 98% on room air without increased work of breathing.  No significant electrolyte abnormality on lab workup.  Mild leukocytosis at 12.5 consistent with patient's clinical dehydration.   No anemia.  UA negative for acute cystitis.  Had a long discussion at bedside with patient about his symptoms.  Patient was reassured that at this time there is no evidence for bacterial infection.  Do recommend close outpatient follow-up with primary care provider on a routine basis=, discussed return precautions at length.  Patient states he feels much better about his health after having received a 3 explanation of his findings.  Patient discharged in stable condition.    Procedure  Procedures     Bernadette Mix MD  11/10/24 1159

## 2024-10-16 ENCOUNTER — HOSPITAL ENCOUNTER (OUTPATIENT)
Dept: RADIOLOGY | Facility: HOSPITAL | Age: 24
Discharge: HOME | End: 2024-10-16
Payer: COMMERCIAL

## 2024-10-16 DIAGNOSIS — N12 PYELONEPHRITIS: ICD-10-CM

## 2024-10-16 PROCEDURE — 76770 US EXAM ABDO BACK WALL COMP: CPT

## 2024-10-16 PROCEDURE — 76770 US EXAM ABDO BACK WALL COMP: CPT | Performed by: RADIOLOGY

## 2024-10-28 ENCOUNTER — APPOINTMENT (OUTPATIENT)
Dept: PRIMARY CARE | Facility: CLINIC | Age: 24
End: 2024-10-28
Payer: COMMERCIAL

## 2024-10-28 VITALS
SYSTOLIC BLOOD PRESSURE: 134 MMHG | WEIGHT: 140 LBS | HEART RATE: 105 BPM | DIASTOLIC BLOOD PRESSURE: 78 MMHG | BODY MASS INDEX: 17.97 KG/M2 | OXYGEN SATURATION: 97 %

## 2024-10-28 DIAGNOSIS — K58.9 IRRITABLE BOWEL SYNDROME, UNSPECIFIED TYPE: ICD-10-CM

## 2024-10-28 DIAGNOSIS — R07.0 THROAT PAIN: ICD-10-CM

## 2024-10-28 DIAGNOSIS — R13.10 DYSPHAGIA, UNSPECIFIED TYPE: Primary | ICD-10-CM

## 2024-10-28 PROCEDURE — 99213 OFFICE O/P EST LOW 20 MIN: CPT | Performed by: INTERNAL MEDICINE

## 2024-10-28 PROCEDURE — 1036F TOBACCO NON-USER: CPT | Performed by: INTERNAL MEDICINE

## 2024-10-28 RX ORDER — AMITRIPTYLINE HYDROCHLORIDE 25 MG/1
25 TABLET, FILM COATED ORAL
Qty: 60 TABLET | Refills: 1 | Status: SHIPPED | OUTPATIENT
Start: 2024-10-28

## 2024-10-30 ENCOUNTER — HOSPITAL ENCOUNTER (OUTPATIENT)
Dept: RADIOLOGY | Facility: HOSPITAL | Age: 24
Discharge: HOME | End: 2024-10-30
Payer: COMMERCIAL

## 2024-10-30 DIAGNOSIS — R13.10 DYSPHAGIA, UNSPECIFIED TYPE: ICD-10-CM

## 2024-10-30 PROCEDURE — 76536 US EXAM OF HEAD AND NECK: CPT

## 2024-10-30 PROCEDURE — 76536 US EXAM OF HEAD AND NECK: CPT | Performed by: RADIOLOGY

## 2024-11-04 ENCOUNTER — APPOINTMENT (OUTPATIENT)
Dept: RADIOLOGY | Facility: HOSPITAL | Age: 24
End: 2024-11-04
Payer: COMMERCIAL

## 2024-11-04 ENCOUNTER — HOSPITAL ENCOUNTER (EMERGENCY)
Facility: HOSPITAL | Age: 24
Discharge: HOME | End: 2024-11-04
Attending: EMERGENCY MEDICINE
Payer: COMMERCIAL

## 2024-11-04 ENCOUNTER — APPOINTMENT (OUTPATIENT)
Dept: CARDIOLOGY | Facility: HOSPITAL | Age: 24
End: 2024-11-04
Payer: COMMERCIAL

## 2024-11-04 VITALS
TEMPERATURE: 97.8 F | BODY MASS INDEX: 17.97 KG/M2 | HEIGHT: 74 IN | OXYGEN SATURATION: 97 % | RESPIRATION RATE: 18 BRPM | SYSTOLIC BLOOD PRESSURE: 134 MMHG | DIASTOLIC BLOOD PRESSURE: 89 MMHG | HEART RATE: 88 BPM | WEIGHT: 140 LBS

## 2024-11-04 DIAGNOSIS — R07.89 ATYPICAL CHEST PAIN: Primary | ICD-10-CM

## 2024-11-04 LAB
ALBUMIN SERPL BCP-MCNC: 5 G/DL (ref 3.4–5)
ALP SERPL-CCNC: 59 U/L (ref 33–120)
ALT SERPL W P-5'-P-CCNC: 20 U/L (ref 10–52)
ANION GAP SERPL CALC-SCNC: 7 MMOL/L (ref 10–20)
APPEARANCE UR: CLEAR
AST SERPL W P-5'-P-CCNC: 18 U/L (ref 9–39)
BASOPHILS # BLD AUTO: 0.02 X10*3/UL (ref 0–0.1)
BASOPHILS NFR BLD AUTO: 0.3 %
BILIRUB DIRECT SERPL-MCNC: 0.1 MG/DL (ref 0–0.3)
BILIRUB SERPL-MCNC: 1.1 MG/DL (ref 0–1.2)
BILIRUB UR STRIP.AUTO-MCNC: NEGATIVE MG/DL
BUN SERPL-MCNC: 10 MG/DL (ref 6–23)
CALCIUM SERPL-MCNC: 10 MG/DL (ref 8.6–10.3)
CARDIAC TROPONIN I PNL SERPL HS: <3 NG/L (ref 0–20)
CHLORIDE SERPL-SCNC: 104 MMOL/L (ref 98–107)
CK SERPL-CCNC: 169 U/L (ref 0–325)
CO2 SERPL-SCNC: 33 MMOL/L (ref 21–32)
COLOR UR: COLORLESS
CREAT SERPL-MCNC: 0.98 MG/DL (ref 0.5–1.3)
EGFRCR SERPLBLD CKD-EPI 2021: >90 ML/MIN/1.73M*2
EOSINOPHIL # BLD AUTO: 0.03 X10*3/UL (ref 0–0.7)
EOSINOPHIL NFR BLD AUTO: 0.5 %
ERYTHROCYTE [DISTWIDTH] IN BLOOD BY AUTOMATED COUNT: 13.3 % (ref 11.5–14.5)
GLUCOSE SERPL-MCNC: 89 MG/DL (ref 74–99)
GLUCOSE UR STRIP.AUTO-MCNC: NORMAL MG/DL
HCT VFR BLD AUTO: 43.7 % (ref 41–52)
HGB BLD-MCNC: 14.8 G/DL (ref 13.5–17.5)
HOLD SPECIMEN: NORMAL
IMM GRANULOCYTES # BLD AUTO: 0.02 X10*3/UL (ref 0–0.7)
IMM GRANULOCYTES NFR BLD AUTO: 0.3 % (ref 0–0.9)
KETONES UR STRIP.AUTO-MCNC: NEGATIVE MG/DL
LEUKOCYTE ESTERASE UR QL STRIP.AUTO: NEGATIVE
LYMPHOCYTES # BLD AUTO: 1.36 X10*3/UL (ref 1.2–4.8)
LYMPHOCYTES NFR BLD AUTO: 21.5 %
MCH RBC QN AUTO: 31.6 PG (ref 26–34)
MCHC RBC AUTO-ENTMCNC: 33.9 G/DL (ref 32–36)
MCV RBC AUTO: 93 FL (ref 80–100)
MONOCYTES # BLD AUTO: 0.6 X10*3/UL (ref 0.1–1)
MONOCYTES NFR BLD AUTO: 9.5 %
NEUTROPHILS # BLD AUTO: 4.29 X10*3/UL (ref 1.2–7.7)
NEUTROPHILS NFR BLD AUTO: 67.9 %
NITRITE UR QL STRIP.AUTO: NEGATIVE
NRBC BLD-RTO: 0 /100 WBCS (ref 0–0)
PH UR STRIP.AUTO: 8 [PH]
PLATELET # BLD AUTO: 170 X10*3/UL (ref 150–450)
POTASSIUM SERPL-SCNC: 4 MMOL/L (ref 3.5–5.3)
PROT SERPL-MCNC: 7.9 G/DL (ref 6.4–8.2)
PROT UR STRIP.AUTO-MCNC: NEGATIVE MG/DL
RBC # BLD AUTO: 4.69 X10*6/UL (ref 4.5–5.9)
RBC # UR STRIP.AUTO: NEGATIVE /UL
SODIUM SERPL-SCNC: 140 MMOL/L (ref 136–145)
SP GR UR STRIP.AUTO: 1.01
UROBILINOGEN UR STRIP.AUTO-MCNC: NORMAL MG/DL
WBC # BLD AUTO: 6.3 X10*3/UL (ref 4.4–11.3)

## 2024-11-04 PROCEDURE — 71045 X-RAY EXAM CHEST 1 VIEW: CPT

## 2024-11-04 PROCEDURE — 85025 COMPLETE CBC W/AUTO DIFF WBC: CPT | Performed by: EMERGENCY MEDICINE

## 2024-11-04 PROCEDURE — 82374 ASSAY BLOOD CARBON DIOXIDE: CPT | Performed by: EMERGENCY MEDICINE

## 2024-11-04 PROCEDURE — 99283 EMERGENCY DEPT VISIT LOW MDM: CPT | Mod: 25

## 2024-11-04 PROCEDURE — 93005 ELECTROCARDIOGRAM TRACING: CPT

## 2024-11-04 PROCEDURE — 71045 X-RAY EXAM CHEST 1 VIEW: CPT | Performed by: RADIOLOGY

## 2024-11-04 PROCEDURE — 84484 ASSAY OF TROPONIN QUANT: CPT | Performed by: EMERGENCY MEDICINE

## 2024-11-04 PROCEDURE — 80053 COMPREHEN METABOLIC PANEL: CPT | Performed by: EMERGENCY MEDICINE

## 2024-11-04 PROCEDURE — 82550 ASSAY OF CK (CPK): CPT | Performed by: EMERGENCY MEDICINE

## 2024-11-04 PROCEDURE — 81003 URINALYSIS AUTO W/O SCOPE: CPT | Performed by: EMERGENCY MEDICINE

## 2024-11-04 PROCEDURE — 80076 HEPATIC FUNCTION PANEL: CPT | Performed by: EMERGENCY MEDICINE

## 2024-11-04 PROCEDURE — 36415 COLL VENOUS BLD VENIPUNCTURE: CPT | Performed by: EMERGENCY MEDICINE

## 2024-11-04 ASSESSMENT — PAIN DESCRIPTION - PROGRESSION: CLINICAL_PROGRESSION: NOT CHANGED

## 2024-11-04 ASSESSMENT — PAIN SCALES - GENERAL: PAINLEVEL_OUTOF10: 8

## 2024-11-04 ASSESSMENT — PAIN DESCRIPTION - FREQUENCY: FREQUENCY: CONSTANT/CONTINUOUS

## 2024-11-04 ASSESSMENT — PAIN DESCRIPTION - ONSET: ONSET: GRADUAL

## 2024-11-04 ASSESSMENT — PAIN - FUNCTIONAL ASSESSMENT: PAIN_FUNCTIONAL_ASSESSMENT: 0-10

## 2024-11-04 ASSESSMENT — PAIN DESCRIPTION - LOCATION: LOCATION: GENERALIZED

## 2024-11-04 ASSESSMENT — PAIN DESCRIPTION - PAIN TYPE: TYPE: ACUTE PAIN

## 2024-11-04 ASSESSMENT — PAIN DESCRIPTION - DESCRIPTORS: DESCRIPTORS: ACHING

## 2024-11-04 NOTE — ED PROVIDER NOTES
HPI   Chief Complaint   Patient presents with   • Chest Pain       HPI: []  24-year-old male with history of recent pyelonephritis comes in with a multitude of complaints.  He states over the last couple months he has had intermittent chest pain.  And throat pain.  Comes and goes.  No trouble breathing no shortness with no diaphoresis no fever no chills no nausea vomiting or diarrhea no hematemesis melena hematochezia no hemoptysis no trauma no falls no headache vision changes.  No weight loss no anorexia no syncope or near syncope.    Past history: None  Social: Patient denies current tobacco alcohol drug abuse.  REVIEW OF SYSTEMS:    GENERAL.: No weight loss, fatigue, anorexia, insomnia, fever.    EYES: No vision loss, double vision, drainage, eye pain.    ENT: No pharyngitis, dry mouth.  Positive intermittent throat pain    CARDIOPULMONARY: Positive intermittent chest pain, palpitations, syncope, near syncope. No shortness of breath, cough, hemoptysis.    GI: No abdominal pain, change in bowel habits, melena, hematemesis, hematochezia, nausea, vomiting, diarrhea.    : No discharge, dysuria, frequency, urgency, hematuria.    MS: No limb pain, joint pain, joint swelling.    SKIN: No rashes.    PSYCH: No depression, anxiety, suicidality, homicidality.    Review of systems is otherwise negative unless stated above or in history of present illness.  Social history, family history, allergies reviewed.  PHYSICAL EXAM:    GENERAL: Vitals noted, no distress. Alert and oriented  x 3. Non-toxic.      EENT: TMs clear. Posterior oropharynx unremarkable. No meningismus. No LAD.  Negative thrush negative angioedema uvula midline    NECK: Supple. Nontender. No midline tenderness.     CARDIAC: Regular, rate, rhythm. No murmurs rubs or gallops. No JVD    PULMONARY: Lungs clear bilaterally with good aeration. No wheezes rales or rhonchi. No respiratory distress.     ABDOMEN: Soft, nonsurgical. Nontender. No peritoneal signs.  Normoactive bowel sounds. No pulsatile masses.     EXTREMITIES: No peripheral edema. Negative Homans bilaterally, no cords.  2+ bounding pulses well-perfused.    SKIN: No rash. Intact.     NEURO: No focal neurologic deficits, NIH score of 0. Cranial nerves normal as tested from II through XII.     MEDICAL DECISION MAKING:  EKG on my interpretation shows a normal sinus rhythm normal axis rate mid 70s with no acute ischemic change.  CBC with differential chemistry LFTs are normal CPK is normal troponin negative chest x-ray negative.    Treatment today: None  ED course: Patient remained stable hemodynamically remains asymptomatic remains normotensive no tachycardia or hypoxia.  Impression: #1 chest pain atypical  Plan set MDM: 24-year-old male comes in with very with complaints of intermittent throat pain intermittent chest pain with a very benign examination my suspicion for acute pharyngitis/tonsillitis angioedema thrush is low.  My suspicion for STEMI NSTEMI ACS dissection pulmonary embolism low.  Patient's heart score is low.  Patient be discharged home advised supportive care and outpatient follow-up recommended with primary doctor with strict return precautions.                Patient History   History reviewed. No pertinent past medical history.  History reviewed. No pertinent surgical history.  No family history on file.  Social History     Tobacco Use   • Smoking status: Never     Passive exposure: Never   • Smokeless tobacco: Never   Substance Use Topics   • Alcohol use: Yes   • Drug use: Never       Physical Exam   ED Triage Vitals [11/04/24 1144]   Temperature Heart Rate Respirations BP   36.6 °C (97.8 °F) (!) 104 18 118/83      Pulse Ox Temp Source Heart Rate Source Patient Position   100 % Temporal Monitor Sitting      BP Location FiO2 (%)     Left arm --       Physical Exam      ED Course & MDM   ED Course as of 11/04/24 1401   Mon Nov 04, 2024   1358 EKG is nonischemic CBC with differential chemistries  LFTs are normal troponin negative CPK normal chest x-ray negative.  Patient reassured will be discharged home with supportive care with an outpatient follow-up and strict return precaution. [MT]      ED Course User Index  [MT] Aryan Yan MD         Diagnoses as of 11/04/24 1401   Atypical chest pain                 No data recorded     [place] Coma Scale Score: 15 (11/04/24 1148 : Honey Jimenez RN)                           Medical Decision Making      Procedure  Procedures     Aryan Yan MD  11/04/24 1401

## 2024-11-04 NOTE — ED TRIAGE NOTES
Patient presents to ED from home for chest pain and neck pain that started 3 days ago but chronic in nature. Patient sees his PCP and he told the patient there is nothing wrong with his heart and lungs and it is related to anxiety. Patient is prescribed a new anxiety medication that he started taking 3 days ago. Patient states as of recently he has been very concerned about his health. He was diagnosed with a UTI in November and now he feels like something is always wrong with him. He was here 3 days ago for his neck/throat pain and had an US of is thyroid that was negative. Patient states the pain is uncomfortable. Patient has not taken any acetaminophen or ibuprofen because he states it does not help. He does state he has night sweats and tremors at night. He would also like his gums checked out because he hasn't seen a dentist since this summer. He believes he might have a case of gingivitis.

## 2024-11-06 LAB
ATRIAL RATE: 110 BPM
P AXIS: 80 DEGREES
P OFFSET: 204 MS
P ONSET: 156 MS
PR INTERVAL: 126 MS
Q ONSET: 219 MS
QRS COUNT: 18 BEATS
QRS DURATION: 86 MS
QT INTERVAL: 310 MS
QTC CALCULATION(BAZETT): 419 MS
QTC FREDERICIA: 379 MS
R AXIS: 93 DEGREES
T AXIS: 73 DEGREES
T OFFSET: 374 MS
VENTRICULAR RATE: 110 BPM

## 2024-11-12 ENCOUNTER — APPOINTMENT (OUTPATIENT)
Dept: PRIMARY CARE | Facility: CLINIC | Age: 24
End: 2024-11-12
Payer: COMMERCIAL

## 2024-11-12 VITALS
HEIGHT: 74 IN | DIASTOLIC BLOOD PRESSURE: 72 MMHG | HEART RATE: 115 BPM | SYSTOLIC BLOOD PRESSURE: 128 MMHG | BODY MASS INDEX: 17.97 KG/M2

## 2024-11-12 DIAGNOSIS — Z00.00 HEALTH CARE MAINTENANCE: Primary | ICD-10-CM

## 2024-11-12 DIAGNOSIS — F41.9 ANXIETY: ICD-10-CM

## 2024-11-12 PROCEDURE — 99395 PREV VISIT EST AGE 18-39: CPT | Performed by: INTERNAL MEDICINE

## 2024-11-12 PROCEDURE — 1036F TOBACCO NON-USER: CPT | Performed by: INTERNAL MEDICINE

## 2024-11-12 NOTE — LETTER
November 12, 2024     Patient: Cailin Almanza   YOB: 2000   Date of Visit: 11/12/2024       To Whom It May Concern:    Cailin Almanza was seen in my clinic on 11/12/2024 at 1:45 pm. Please excuse Cailin for his absence from work on this day to make the appointment.    If you have any questions or concerns, please don't hesitate to call.         Sincerely,         Clifton Castro,         CC: No Recipients

## 2024-11-12 NOTE — PROGRESS NOTES
"Subjective   Patient ID: Cailin Almanza is a 24 y.o. male who presents for No chief complaint on file..          HPI     Patient is a 24-year-old male with past medical history of nonspecific abdominal pain who presents for wellness.  No specific complaints today.  Is been to the emergency room quite a few times in the last several years.  Greater than 20 in the last 3 years.  All for nonspecific abdominal pain.  He has a history of Vipul with most recent ultrasound negative.  Labs are all up-to-date.  He smokes marijuana quite frequently.  He works for [employer].  No motivation.  He is concerned about a diagnosis of ADHD but difficult to diagnose while he is on excessive amounts of marijuana.  Denies smoking.  Alcohol occasionally.  No significant exercise.  He spends a lot of his money on games and self-care but no investments.    Review of Systems  Constitutional: No fever or chills, No Night Sweats  Eyes: No Blurry Vision or Eye sight problems  ENT: No Nasal Discharge, Hoarseness, sore throat  Cardiovascular: no chest pain, no palpitations and no syncope.   Respiratory: no cough, no shortness of breath during exertion and no shortness of breath at rest.   Gastrointestinal: no abdominal pain, no nausea and no vomiting.   : No issues with urinary stream, burning with urination, no blood in urine or stools  Skin: No Skin rashes or Lesions  Neuro: No Headache, no dizziness or Numbness or tingling  Psych: No Anxiety, depression or sleeping problems  Heme: No Easy bleeding or brusing.     Objective   /72   Pulse (!) 115   Ht 1.88 m (6' 2\")   BMI 17.97 kg/m²     Physical Exam  Constitutional: Alert and in no acute distress. Well developed, well nourished.   Head and Face: Head and face: Normal.    Eyes: Normal external exam. Pupils were equal in size, round, reactive to light (PERRL) with normal accommodation and extraocular movements intact (EOMI).   Ears, Nose, Mouth, and Throat: External " inspection of ears and nose: Normal.  Hearing: Normal.  Nasal mucosa, septum, and turbinates: Normal.  Lips, teeth, and gums: Normal.  Oropharynx: Normal.   Neck: No neck mass was observed. Supple. Thyroid not enlarged and there were no palpable thyroid nodules.   Cardiovascular: Heart rate and rhythm were normal, normal S1 and S2. Pedal pulses: Normal. No peripheral edema.   Pulmonary: No respiratory distress. Clear bilateral breath sounds.   Abdomen: Soft nontender; no abdominal mass palpated. Normal bowel sounds. No organomegaly.   : Deferred  Musculoskeletal: No joint swelling seen, normal movements of all extremities. Range of motion: Normal.  Muscle strength/tone: Normal.    Skin: Normal skin color and pigmentation, normal skin turgor, and no rash.   Neurologic: Deep tendon reflexes were 2+ and symmetric.   Psychiatric: Judgment and insight: Intact. Mood and affect: Normal.  Lymphatic: No cervical lymphadenopathy. Palpation of lymph nodes in axillae: Normal.  Palpation of lymph nodes in groin: Normal.    Lab Results   Component Value Date    WBC 6.3 11/04/2024    HGB 14.8 11/04/2024    HCT 43.7 11/04/2024     11/04/2024    ALT 20 11/04/2024    AST 18 11/04/2024     11/04/2024    K 4.0 11/04/2024     11/04/2024    CREATININE 0.98 11/04/2024    BUN 10 11/04/2024    CO2 33 (H) 11/04/2024       ECG 12 lead  Sinus tachycardia  Rightward axis  Borderline ECG  When compared with ECG of 09-OCT-2024 21:59, (unconfirmed)  Vent. rate has increased BY  36 BPM  See ED provider note for full interpretation and clinical correlation  Confirmed by Krista Costello ([id number]) on 11/6/2024 10:17:54 AM      Assessment/Plan   Problem List Items Addressed This Visit             ICD-10-CM    Health care maintenance - Primary Z00.00    Relevant Orders    Follow Up In [facility] - PCP - Established     Other Visit Diagnoses         Codes    Anxiety     F41.9          Return to clinic 30 days for  reevaluation.  Will check urine drug screen at that time to ensure that he has been clean of the marijuana use.  Explained that we can consider referral to psychiatry to determine if he has ADHD but difficult to diagnose while on marijuana    Dear Cailin Almanza     It was my pleasure to take care of you today in the office. Below are the things we discussed today:    1. Immunizations: Yearly Flu shot is recommended.       Deferring vaccines    2. Blood Work: Up-to-date  3. Seen your dentist twice a year  4. Yearly Eye exam is recommended    5. BMI: 18  6: Diet recommendations:   Eat Clean, Try to have as many home cooked meals as possible  Avoid processed foods which contain excess calories, sugar, and sodium.    7. Exercise recommendations:   150 minutes a week to maintain your weight     If you have to loose weight, you need a better diet and exercise plan.     8. Please get your Living will / Advance directive completed if you do not have one already. Please make sure our office has a copy of the latest one.     9. Colonoscopy: N/A  10. PSA: N/A    11.  Follow-up 30 days    Follow up in one year for a Physical. Please call the office before your Physical to see if you need blood work completed prior to your physical.     Please call me if any questions arise from now until your next visit. I will call you after I am done seeing patients. A Doctor is always available by phone when the office is closed. Please feel free to call for help with any problem that you feel shouldn't wait until the office re-opens.     Clifton Castro, DO

## 2024-12-02 ENCOUNTER — APPOINTMENT (OUTPATIENT)
Dept: CARDIOLOGY | Facility: HOSPITAL | Age: 24
End: 2024-12-02
Payer: COMMERCIAL

## 2024-12-02 ENCOUNTER — HOSPITAL ENCOUNTER (EMERGENCY)
Facility: HOSPITAL | Age: 24
Discharge: HOME | End: 2024-12-02
Attending: STUDENT IN AN ORGANIZED HEALTH CARE EDUCATION/TRAINING PROGRAM
Payer: COMMERCIAL

## 2024-12-02 VITALS
BODY MASS INDEX: 17.97 KG/M2 | WEIGHT: 140 LBS | DIASTOLIC BLOOD PRESSURE: 78 MMHG | HEIGHT: 74 IN | HEART RATE: 90 BPM | RESPIRATION RATE: 18 BRPM | OXYGEN SATURATION: 100 % | TEMPERATURE: 97.6 F | SYSTOLIC BLOOD PRESSURE: 108 MMHG

## 2024-12-02 DIAGNOSIS — R10.84 GENERALIZED ABDOMINAL PAIN: ICD-10-CM

## 2024-12-02 DIAGNOSIS — R07.89 ATYPICAL CHEST PAIN: Primary | ICD-10-CM

## 2024-12-02 LAB
ALBUMIN SERPL BCP-MCNC: 4.6 G/DL (ref 3.4–5)
ALP SERPL-CCNC: 52 U/L (ref 33–120)
ALT SERPL W P-5'-P-CCNC: 23 U/L (ref 10–52)
ANION GAP SERPL CALC-SCNC: 7 MMOL/L (ref 10–20)
AST SERPL W P-5'-P-CCNC: 17 U/L (ref 9–39)
BASOPHILS # BLD AUTO: 0.03 X10*3/UL (ref 0–0.1)
BASOPHILS NFR BLD AUTO: 0.5 %
BILIRUB SERPL-MCNC: 0.7 MG/DL (ref 0–1.2)
BUN SERPL-MCNC: 17 MG/DL (ref 6–23)
CALCIUM SERPL-MCNC: 10 MG/DL (ref 8.6–10.3)
CARDIAC TROPONIN I PNL SERPL HS: <3 NG/L (ref 0–20)
CHLORIDE SERPL-SCNC: 107 MMOL/L (ref 98–107)
CO2 SERPL-SCNC: 32 MMOL/L (ref 21–32)
CREAT SERPL-MCNC: 0.94 MG/DL (ref 0.5–1.3)
EGFRCR SERPLBLD CKD-EPI 2021: >90 ML/MIN/1.73M*2
EOSINOPHIL # BLD AUTO: 0.02 X10*3/UL (ref 0–0.7)
EOSINOPHIL NFR BLD AUTO: 0.3 %
ERYTHROCYTE [DISTWIDTH] IN BLOOD BY AUTOMATED COUNT: 13 % (ref 11.5–14.5)
GLUCOSE SERPL-MCNC: 86 MG/DL (ref 74–99)
HCT VFR BLD AUTO: 42.4 % (ref 41–52)
HGB BLD-MCNC: 14.2 G/DL (ref 13.5–17.5)
IMM GRANULOCYTES # BLD AUTO: 0.03 X10*3/UL (ref 0–0.7)
IMM GRANULOCYTES NFR BLD AUTO: 0.5 % (ref 0–0.9)
LYMPHOCYTES # BLD AUTO: 1.59 X10*3/UL (ref 1.2–4.8)
LYMPHOCYTES NFR BLD AUTO: 25.8 %
MCH RBC QN AUTO: 31.8 PG (ref 26–34)
MCHC RBC AUTO-ENTMCNC: 33.5 G/DL (ref 32–36)
MCV RBC AUTO: 95 FL (ref 80–100)
MONOCYTES # BLD AUTO: 0.68 X10*3/UL (ref 0.1–1)
MONOCYTES NFR BLD AUTO: 11 %
NEUTROPHILS # BLD AUTO: 3.81 X10*3/UL (ref 1.2–7.7)
NEUTROPHILS NFR BLD AUTO: 61.9 %
NRBC BLD-RTO: 0 /100 WBCS (ref 0–0)
PLATELET # BLD AUTO: 192 X10*3/UL (ref 150–450)
POTASSIUM SERPL-SCNC: 4.8 MMOL/L (ref 3.5–5.3)
PROT SERPL-MCNC: 6.9 G/DL (ref 6.4–8.2)
RBC # BLD AUTO: 4.46 X10*6/UL (ref 4.5–5.9)
SODIUM SERPL-SCNC: 141 MMOL/L (ref 136–145)
WBC # BLD AUTO: 6.2 X10*3/UL (ref 4.4–11.3)

## 2024-12-02 PROCEDURE — 80053 COMPREHEN METABOLIC PANEL: CPT

## 2024-12-02 PROCEDURE — 2500000001 HC RX 250 WO HCPCS SELF ADMINISTERED DRUGS (ALT 637 FOR MEDICARE OP): Performed by: STUDENT IN AN ORGANIZED HEALTH CARE EDUCATION/TRAINING PROGRAM

## 2024-12-02 PROCEDURE — 93005 ELECTROCARDIOGRAM TRACING: CPT

## 2024-12-02 PROCEDURE — 84484 ASSAY OF TROPONIN QUANT: CPT

## 2024-12-02 PROCEDURE — 99284 EMERGENCY DEPT VISIT MOD MDM: CPT | Performed by: STUDENT IN AN ORGANIZED HEALTH CARE EDUCATION/TRAINING PROGRAM

## 2024-12-02 PROCEDURE — 36415 COLL VENOUS BLD VENIPUNCTURE: CPT

## 2024-12-02 PROCEDURE — 85025 COMPLETE CBC W/AUTO DIFF WBC: CPT

## 2024-12-02 RX ORDER — IBUPROFEN 600 MG/1
600 TABLET ORAL EVERY 6 HOURS
Qty: 56 TABLET | Refills: 0 | Status: SHIPPED | OUTPATIENT
Start: 2024-12-02 | End: 2024-12-16

## 2024-12-02 RX ORDER — OMEPRAZOLE 20 MG/1
20 CAPSULE, DELAYED RELEASE ORAL DAILY
Qty: 30 CAPSULE | Refills: 0 | Status: SHIPPED | OUTPATIENT
Start: 2024-12-02 | End: 2025-01-01

## 2024-12-02 RX ORDER — FAMOTIDINE 20 MG/1
20 TABLET, FILM COATED ORAL 2 TIMES DAILY
Qty: 30 TABLET | Refills: 0 | Status: SHIPPED | OUTPATIENT
Start: 2024-12-02 | End: 2024-12-17

## 2024-12-02 RX ORDER — PANTOPRAZOLE SODIUM 40 MG/1
40 TABLET, DELAYED RELEASE ORAL ONCE
Status: COMPLETED | OUTPATIENT
Start: 2024-12-02 | End: 2024-12-02

## 2024-12-02 RX ORDER — PANTOPRAZOLE SODIUM 40 MG/10ML
40 INJECTION, POWDER, LYOPHILIZED, FOR SOLUTION INTRAVENOUS DAILY
Status: DISCONTINUED | OUTPATIENT
Start: 2024-12-02 | End: 2024-12-02

## 2024-12-02 ASSESSMENT — PAIN - FUNCTIONAL ASSESSMENT: PAIN_FUNCTIONAL_ASSESSMENT: 0-10

## 2024-12-02 ASSESSMENT — PAIN SCALES - GENERAL: PAINLEVEL_OUTOF10: 5 - MODERATE PAIN

## 2024-12-02 ASSESSMENT — PAIN DESCRIPTION - DESCRIPTORS: DESCRIPTORS: ACHING

## 2024-12-02 ASSESSMENT — COLUMBIA-SUICIDE SEVERITY RATING SCALE - C-SSRS
2. HAVE YOU ACTUALLY HAD ANY THOUGHTS OF KILLING YOURSELF?: NO
1. IN THE PAST MONTH, HAVE YOU WISHED YOU WERE DEAD OR WISHED YOU COULD GO TO SLEEP AND NOT WAKE UP?: NO
6. HAVE YOU EVER DONE ANYTHING, STARTED TO DO ANYTHING, OR PREPARED TO DO ANYTHING TO END YOUR LIFE?: NO

## 2024-12-02 ASSESSMENT — PAIN DESCRIPTION - PAIN TYPE: TYPE: ACUTE PAIN

## 2024-12-02 ASSESSMENT — PAIN DESCRIPTION - LOCATION: LOCATION: NECK

## 2024-12-02 ASSESSMENT — PAIN DESCRIPTION - ORIENTATION: ORIENTATION: RIGHT

## 2024-12-02 NOTE — ED PROVIDER NOTES
HPI   Chief Complaint   Patient presents with    Neck Pain       24-year-old male past medical history of anxiety presents to the ED today with a chief concern of neck pain and chest pain.  Patient reports symptoms started 1 month ago.  He reports his symptoms are intermittent.  They are usually not present in the morning but then throughout the day when he eats he notices the symptoms more.  Reports burping and belching with the symptoms as well.  Describes the pain of the neck and chest is aching and burning.  He reports gurgling in his stomach.  He denies any fever/chills, nausea/vomiting.  Denies diarrhea.  Denies any history of PE or DVT.  Denies recent travel or surgeries.  Denies leg swelling.  He denies cough or hemoptysis.  He denies any lightheadedness, dizziness, or imbalance.  Denies numbness or tingling or weakness.  Denies any problems swallowing.  No sore throat.  Reports that he was on omeprazole.  Symptoms went away with omeprazole but returned after this medication was discontinued.  Reports that he just ran out of this medication.  Has no other symptoms or concerns at this time.      History provided by:  Patient   used: No            Patient History   No past medical history on file.  No past surgical history on file.  No family history on file.  Social History     Tobacco Use    Smoking status: Never     Passive exposure: Never    Smokeless tobacco: Never   Substance Use Topics    Alcohol use: Yes    Drug use: Never       Physical Exam   ED Triage Vitals [12/02/24 1236]   Temperature Heart Rate Respirations BP   36.4 °C (97.6 °F) 90 18 108/78      Pulse Ox Temp src Heart Rate Source Patient Position   100 % -- -- --      BP Location FiO2 (%)     -- --       Physical Exam  Gen.: Vitals noted no distress afebrile. Normal phonation, no stridor, no trismus. Patient is handling secretions well without any tripod positioning or drooling.  ENT: TMs clear bilaterally, EACs  unremarkable. Mastoids nontender. Posterior oropharynx without erythema, exudate, or swelling. Uvula is in the midline and nonedematous. No Joni's Angina.    Neck: Supple. No meningismus through full range of motion. Thyroid midline.  No nodules palpated.  Cardiac: Regular rate rhythm no murmur.   Lungs: Good aeration throughout. No adventitious breath sounds.   Abdomen: Soft nontender nonsurgical throughout  Extremities: No peripheral edema. extremities are moist with good skin turgor.  Skin: No rash.   Neuro: No focal neurologic deficits. cranial nerves II-XII grossly intact.       ED Course & MDM   ED Course as of 12/02/24 1554   Mon Dec 02, 2024   1321 FINDINGS:      The cardiomediastinal silhouette is within normal limits for the  technique. There is no pneumothorax, confluent infiltrates or  significant effusion. The osseous structures are unremarkable.      IMPRESSION:  Normal chest.      Signed by: Anderson Riddle 11/4/2024 12:47 PM  Dictation workstation:   [id number]   []   1431 EKG interpreted by me.  Ventricular rate 84 bpm.  KY interval 128 ms.  QRS duration 86 ms.  QT/QTc 336/397.  Patient is in normal sinus rhythm at a rate of 84 bpm.  Normal axis.  There is good R wave progression.  No right or left bundle-branch block.  There is diffuse ST elevation likely related to early repolarization versus pericarditis.  Compared with multiple previous EKGs grossly unchanged. [MC]      ED Course User Index  [MC] Donovan Guajardo PA-C         Diagnoses as of 12/02/24 1554   Atypical chest pain                 No data recorded                                 Medical Decision Making  24-year-old male past medical history of anxiety presents the ED today with a chief concern of chest pain and neck pain.  Vital signs reassuring.  Patient overall appears well and is nontoxic-appearing.  Was given pantoprazole in the ED. patient has full range of motion of the neck without any meningismus.  Satting well on room  air.  Not hypoxic.  Not tachycardic.  Afebrile.  X-ray shows evidence of earlier polarization versus pericarditis.  His troponin is negative.  Labs reassuring.  Normal white blood cell count.  No acute kidney injury or liver injury.  Abdomen is soft and nontender on exam.  No evidence of cardiac tamponade.  Will treat with ibuprofen for possible pericarditis.  His EKG was unchanged from last time.  Will also refill omeprazole and add famotidine.  Discussed my impression and findings with patient he feels comfortable returning home.  We discussed very strict return precautions including returning for any new or worsening signs or symptoms.  Patient is in agreement with this plan.  He will with the PCP within 3 days.  Already has an appointment scheduled with gastroenterology this month. Patient was seen and evaluated by ed attending physician     Differential diagnosis: GERD, ACS, aortic dissection, Boerhaave syndrome, PE, pneumothorax, pericardial tamponade, cocaine induced chest pain, endocarditis, myocarditis, cardiomyopathy, COPD, asthma, costochondritis/musculoskeletal, pericarditis, pneumonia, zoster    Disposition/treatment  1.  See diagnosis    Shared decision-making was used patient feels comfortable returning home       Patient was advised to follow up with recommended provider in 1 day1 for another evaluation and exam. I advised patient/guardian to return or go to closest emergency room immediately if symptoms change, get worse, new symptoms develop prior to follow up. If there is no improvement in symptoms in the next 24 hours they are advised to return for further evaluation and exam. I also explained the plan and treatment course. Patient/guardian is in agreement with plan, treatment course, and follow up and states verbally that they will comply.    Homegoing. I discussed the differential; results and discharge plan with the patient and/or family/friend/caregiver if present.  I emphasized the  importance of follow-up with the physician I referred them to in the timeframe recommended.  I explained reasons for the patient to return to the Emergency Department. They agreed that if they feel their condition is worsening or if they have any other concern they should call 911 immediately for further assistance. I gave the patient an opportunity to ask all questions they had and answered all of them accordingly. They understand return precautions and discharge instructions. The patient and/or family/friend/caregiver expressed understanding verbally and that they would comply.        This note has been transcribed using voice recognition and may contain grammatical errors, misplaced words, incorrect words, incorrect phrases or other errors.        Procedure  Procedures     Donovan Guajardo PA-C  12/02/24 [id number]

## 2024-12-02 NOTE — ED TRIAGE NOTES
Pt to ed with c/o neck pain. Pt states beginning of November his neck became to hurt. Denies any known injury. Endorses getting an US done that came back negative. Denies numbness/tingling/vision changes.

## 2024-12-05 LAB
ATRIAL RATE: 84 BPM
P AXIS: 7 DEGREES
P OFFSET: 203 MS
P ONSET: 159 MS
PR INTERVAL: 128 MS
Q ONSET: 223 MS
QRS COUNT: 14 BEATS
QRS DURATION: 86 MS
QT INTERVAL: 336 MS
QTC CALCULATION(BAZETT): 397 MS
QTC FREDERICIA: 375 MS
R AXIS: 91 DEGREES
T AXIS: 73 DEGREES
T OFFSET: 391 MS
VENTRICULAR RATE: 84 BPM

## 2024-12-10 ENCOUNTER — APPOINTMENT (OUTPATIENT)
Dept: PRIMARY CARE | Facility: CLINIC | Age: 24
End: 2024-12-10
Payer: COMMERCIAL

## 2024-12-17 ENCOUNTER — APPOINTMENT (OUTPATIENT)
Dept: PRIMARY CARE | Facility: CLINIC | Age: 24
End: 2024-12-17
Payer: COMMERCIAL

## 2025-01-27 ENCOUNTER — APPOINTMENT (OUTPATIENT)
Dept: PRIMARY CARE | Facility: CLINIC | Age: 25
End: 2025-01-27
Payer: COMMERCIAL